# Patient Record
Sex: FEMALE | Race: WHITE | HISPANIC OR LATINO | Employment: FULL TIME | URBAN - METROPOLITAN AREA
[De-identification: names, ages, dates, MRNs, and addresses within clinical notes are randomized per-mention and may not be internally consistent; named-entity substitution may affect disease eponyms.]

---

## 2021-04-28 ENCOUNTER — APPOINTMENT (OUTPATIENT)
Dept: RADIOLOGY | Facility: MEDICAL CENTER | Age: 26
End: 2021-04-28
Attending: EMERGENCY MEDICINE
Payer: COMMERCIAL

## 2021-04-28 ENCOUNTER — ANESTHESIA EVENT (OUTPATIENT)
Dept: SURGERY | Facility: MEDICAL CENTER | Age: 26
End: 2021-04-28
Payer: COMMERCIAL

## 2021-04-28 ENCOUNTER — ANESTHESIA (OUTPATIENT)
Dept: SURGERY | Facility: MEDICAL CENTER | Age: 26
End: 2021-04-28
Payer: COMMERCIAL

## 2021-04-28 ENCOUNTER — PHARMACY VISIT (OUTPATIENT)
Dept: PHARMACY | Facility: MEDICAL CENTER | Age: 26
End: 2021-04-28
Payer: COMMERCIAL

## 2021-04-28 ENCOUNTER — HOSPITAL ENCOUNTER (OUTPATIENT)
Facility: MEDICAL CENTER | Age: 26
End: 2021-04-28
Attending: EMERGENCY MEDICINE | Admitting: OBSTETRICS & GYNECOLOGY
Payer: COMMERCIAL

## 2021-04-28 VITALS
OXYGEN SATURATION: 95 % | HEIGHT: 60 IN | BODY MASS INDEX: 39.66 KG/M2 | DIASTOLIC BLOOD PRESSURE: 88 MMHG | TEMPERATURE: 97.8 F | RESPIRATION RATE: 16 BRPM | SYSTOLIC BLOOD PRESSURE: 133 MMHG | WEIGHT: 202 LBS | HEART RATE: 108 BPM

## 2021-04-28 DIAGNOSIS — R10.9 ABDOMINAL PAIN, UNSPECIFIED ABDOMINAL LOCATION: ICD-10-CM

## 2021-04-28 DIAGNOSIS — R93.89 ABNORMAL PELVIC ULTRASOUND: ICD-10-CM

## 2021-04-28 DIAGNOSIS — G89.18 POSTOPERATIVE PAIN: ICD-10-CM

## 2021-04-28 DIAGNOSIS — D72.829 LEUKOCYTOSIS, UNSPECIFIED TYPE: ICD-10-CM

## 2021-04-28 PROBLEM — N80.9 ENDOMETRIOSIS: Status: ACTIVE | Noted: 2021-04-28

## 2021-04-28 PROBLEM — E66.9 CLASS 2 OBESITY IN ADULT: Status: ACTIVE | Noted: 2021-04-28

## 2021-04-28 LAB
ALBUMIN SERPL BCP-MCNC: 4 G/DL (ref 3.2–4.9)
ALBUMIN/GLOB SERPL: 1.1 G/DL
ALP SERPL-CCNC: 95 U/L (ref 30–99)
ALT SERPL-CCNC: 30 U/L (ref 2–50)
ANION GAP SERPL CALC-SCNC: 10 MMOL/L (ref 7–16)
AST SERPL-CCNC: 15 U/L (ref 12–45)
BASOPHILS # BLD AUTO: 0.3 % (ref 0–1.8)
BASOPHILS # BLD: 0.05 K/UL (ref 0–0.12)
BILIRUB SERPL-MCNC: 0.3 MG/DL (ref 0.1–1.5)
BUN SERPL-MCNC: 10 MG/DL (ref 8–22)
CALCIUM SERPL-MCNC: 9.2 MG/DL (ref 8.5–10.5)
CHLORIDE SERPL-SCNC: 105 MMOL/L (ref 96–112)
CO2 SERPL-SCNC: 22 MMOL/L (ref 20–33)
CREAT SERPL-MCNC: 0.49 MG/DL (ref 0.5–1.4)
EOSINOPHIL # BLD AUTO: 0.06 K/UL (ref 0–0.51)
EOSINOPHIL NFR BLD: 0.3 % (ref 0–6.9)
ERYTHROCYTE [DISTWIDTH] IN BLOOD BY AUTOMATED COUNT: 43.6 FL (ref 35.9–50)
GLOBULIN SER CALC-MCNC: 3.5 G/DL (ref 1.9–3.5)
GLUCOSE SERPL-MCNC: 98 MG/DL (ref 65–99)
HCG SERPL QL: NEGATIVE
HCT VFR BLD AUTO: 42.7 % (ref 37–47)
HGB BLD-MCNC: 13.7 G/DL (ref 12–16)
IMM GRANULOCYTES # BLD AUTO: 0.12 K/UL (ref 0–0.11)
IMM GRANULOCYTES NFR BLD AUTO: 0.6 % (ref 0–0.9)
LIPASE SERPL-CCNC: 19 U/L (ref 11–82)
LYMPHOCYTES # BLD AUTO: 2 K/UL (ref 1–4.8)
LYMPHOCYTES NFR BLD: 10.7 % (ref 22–41)
MCH RBC QN AUTO: 31 PG (ref 27–33)
MCHC RBC AUTO-ENTMCNC: 32.1 G/DL (ref 33.6–35)
MCV RBC AUTO: 96.6 FL (ref 81.4–97.8)
MONOCYTES # BLD AUTO: 0.7 K/UL (ref 0–0.85)
MONOCYTES NFR BLD AUTO: 3.7 % (ref 0–13.4)
NEUTROPHILS # BLD AUTO: 15.8 K/UL (ref 2–7.15)
NEUTROPHILS NFR BLD: 84.4 % (ref 44–72)
NRBC # BLD AUTO: 0 K/UL
NRBC BLD-RTO: 0 /100 WBC
PLATELET # BLD AUTO: 267 K/UL (ref 164–446)
PMV BLD AUTO: 11.5 FL (ref 9–12.9)
POTASSIUM SERPL-SCNC: 4 MMOL/L (ref 3.6–5.5)
PROT SERPL-MCNC: 7.5 G/DL (ref 6–8.2)
RBC # BLD AUTO: 4.42 M/UL (ref 4.2–5.4)
SARS-COV+SARS-COV-2 AG RESP QL IA.RAPID: NOTDETECTED
SARS-COV-2 RNA RESP QL NAA+PROBE: NOTDETECTED
SODIUM SERPL-SCNC: 137 MMOL/L (ref 135–145)
SPECIMEN SOURCE: NORMAL
SPECIMEN SOURCE: NORMAL
WBC # BLD AUTO: 18.7 K/UL (ref 4.8–10.8)

## 2021-04-28 PROCEDURE — U0003 INFECTIOUS AGENT DETECTION BY NUCLEIC ACID (DNA OR RNA); SEVERE ACUTE RESPIRATORY SYNDROME CORONAVIRUS 2 (SARS-COV-2) (CORONAVIRUS DISEASE [COVID-19]), AMPLIFIED PROBE TECHNIQUE, MAKING USE OF HIGH THROUGHPUT TECHNOLOGIES AS DESCRIBED BY CMS-2020-01-R: HCPCS

## 2021-04-28 PROCEDURE — 160048 HCHG OR STATISTICAL LEVEL 1-5: Performed by: OBSTETRICS & GYNECOLOGY

## 2021-04-28 PROCEDURE — 160025 RECOVERY II MINUTES (STATS): Performed by: OBSTETRICS & GYNECOLOGY

## 2021-04-28 PROCEDURE — 700101 HCHG RX REV CODE 250: Performed by: ANESTHESIOLOGY

## 2021-04-28 PROCEDURE — 502704 HCHG DEVICE, LIGASURE IMPACT: Performed by: OBSTETRICS & GYNECOLOGY

## 2021-04-28 PROCEDURE — 84703 CHORIONIC GONADOTROPIN ASSAY: CPT

## 2021-04-28 PROCEDURE — 500002 HCHG ADHESIVE, DERMABOND: Performed by: OBSTETRICS & GYNECOLOGY

## 2021-04-28 PROCEDURE — 700111 HCHG RX REV CODE 636 W/ 250 OVERRIDE (IP): Performed by: ANESTHESIOLOGY

## 2021-04-28 PROCEDURE — 99291 CRITICAL CARE FIRST HOUR: CPT

## 2021-04-28 PROCEDURE — 160009 HCHG ANES TIME/MIN: Performed by: OBSTETRICS & GYNECOLOGY

## 2021-04-28 PROCEDURE — 160035 HCHG PACU - 1ST 60 MINS PHASE I: Performed by: OBSTETRICS & GYNECOLOGY

## 2021-04-28 PROCEDURE — 502703 HCHG DEVICE, LIGASURE V SEALER: Performed by: OBSTETRICS & GYNECOLOGY

## 2021-04-28 PROCEDURE — 501577 HCHG TROCAR, STEP 11MM: Performed by: OBSTETRICS & GYNECOLOGY

## 2021-04-28 PROCEDURE — 88305 TISSUE EXAM BY PATHOLOGIST: CPT

## 2021-04-28 PROCEDURE — 700111 HCHG RX REV CODE 636 W/ 250 OVERRIDE (IP): Performed by: EMERGENCY MEDICINE

## 2021-04-28 PROCEDURE — 76856 US EXAM PELVIC COMPLETE: CPT

## 2021-04-28 PROCEDURE — 500886 HCHG PACK, LAPAROSCOPY: Performed by: OBSTETRICS & GYNECOLOGY

## 2021-04-28 PROCEDURE — 85025 COMPLETE CBC W/AUTO DIFF WBC: CPT

## 2021-04-28 PROCEDURE — 501570 HCHG TROCAR, SEPARATOR: Performed by: OBSTETRICS & GYNECOLOGY

## 2021-04-28 PROCEDURE — 160002 HCHG RECOVERY MINUTES (STAT): Performed by: OBSTETRICS & GYNECOLOGY

## 2021-04-28 PROCEDURE — RXMED WILLOW AMBULATORY MEDICATION CHARGE: Performed by: OBSTETRICS & GYNECOLOGY

## 2021-04-28 PROCEDURE — U0005 INFEC AGEN DETEC AMPLI PROBE: HCPCS

## 2021-04-28 PROCEDURE — 501584 HCHG TROCAR, THRD CAN&SEAL11X100: Performed by: OBSTETRICS & GYNECOLOGY

## 2021-04-28 PROCEDURE — C9803 HOPD COVID-19 SPEC COLLECT: HCPCS | Performed by: OBSTETRICS & GYNECOLOGY

## 2021-04-28 PROCEDURE — 87426 SARSCOV CORONAVIRUS AG IA: CPT

## 2021-04-28 PROCEDURE — 501583 HCHG TROCAR, THRD CAN&SEAL 5X100: Performed by: OBSTETRICS & GYNECOLOGY

## 2021-04-28 PROCEDURE — 700101 HCHG RX REV CODE 250: Performed by: OBSTETRICS & GYNECOLOGY

## 2021-04-28 PROCEDURE — 160036 HCHG PACU - EA ADDL 30 MINS PHASE I: Performed by: OBSTETRICS & GYNECOLOGY

## 2021-04-28 PROCEDURE — 700105 HCHG RX REV CODE 258: Performed by: ANESTHESIOLOGY

## 2021-04-28 PROCEDURE — 80053 COMPREHEN METABOLIC PANEL: CPT

## 2021-04-28 PROCEDURE — 160041 HCHG SURGERY MINUTES - EA ADDL 1 MIN LEVEL 4: Performed by: OBSTETRICS & GYNECOLOGY

## 2021-04-28 PROCEDURE — 501838 HCHG SUTURE GENERAL: Performed by: OBSTETRICS & GYNECOLOGY

## 2021-04-28 PROCEDURE — 58662 LAPAROSCOPY EXCISE LESIONS: CPT | Mod: 80 | Performed by: OBSTETRICS & GYNECOLOGY

## 2021-04-28 PROCEDURE — 83690 ASSAY OF LIPASE: CPT

## 2021-04-28 PROCEDURE — 501399 HCHG SPECIMAN BAG, ENDO CATC: Performed by: OBSTETRICS & GYNECOLOGY

## 2021-04-28 PROCEDURE — 96374 THER/PROPH/DIAG INJ IV PUSH: CPT

## 2021-04-28 PROCEDURE — 160029 HCHG SURGERY MINUTES - 1ST 30 MINS LEVEL 4: Performed by: OBSTETRICS & GYNECOLOGY

## 2021-04-28 PROCEDURE — 96375 TX/PRO/DX INJ NEW DRUG ADDON: CPT

## 2021-04-28 PROCEDURE — 160046 HCHG PACU - 1ST 60 MINS PHASE II: Performed by: OBSTETRICS & GYNECOLOGY

## 2021-04-28 RX ORDER — DOCUSATE SODIUM 100 MG/1
100 CAPSULE, LIQUID FILLED ORAL 2 TIMES DAILY PRN
Qty: 30 CAPSULE | Refills: 1 | Status: SHIPPED | OUTPATIENT
Start: 2021-04-28

## 2021-04-28 RX ORDER — HYDROMORPHONE HYDROCHLORIDE 2 MG/ML
INJECTION, SOLUTION INTRAMUSCULAR; INTRAVENOUS; SUBCUTANEOUS PRN
Status: DISCONTINUED | OUTPATIENT
Start: 2021-04-28 | End: 2021-04-28 | Stop reason: SURG

## 2021-04-28 RX ORDER — MIDAZOLAM HYDROCHLORIDE 1 MG/ML
INJECTION INTRAMUSCULAR; INTRAVENOUS PRN
Status: DISCONTINUED | OUTPATIENT
Start: 2021-04-28 | End: 2021-04-28 | Stop reason: SURG

## 2021-04-28 RX ORDER — KETOROLAC TROMETHAMINE 30 MG/ML
INJECTION, SOLUTION INTRAMUSCULAR; INTRAVENOUS PRN
Status: DISCONTINUED | OUTPATIENT
Start: 2021-04-28 | End: 2021-04-28 | Stop reason: SURG

## 2021-04-28 RX ORDER — HYDROMORPHONE HYDROCHLORIDE 1 MG/ML
0.2 INJECTION, SOLUTION INTRAMUSCULAR; INTRAVENOUS; SUBCUTANEOUS
Status: DISCONTINUED | OUTPATIENT
Start: 2021-04-28 | End: 2021-04-28 | Stop reason: HOSPADM

## 2021-04-28 RX ORDER — LIDOCAINE HYDROCHLORIDE 20 MG/ML
INJECTION, SOLUTION EPIDURAL; INFILTRATION; INTRACAUDAL; PERINEURAL PRN
Status: DISCONTINUED | OUTPATIENT
Start: 2021-04-28 | End: 2021-04-28 | Stop reason: SURG

## 2021-04-28 RX ORDER — SUCCINYLCHOLINE/SOD CL,ISO/PF 200MG/10ML
SYRINGE (ML) INTRAVENOUS PRN
Status: DISCONTINUED | OUTPATIENT
Start: 2021-04-28 | End: 2021-04-28 | Stop reason: SURG

## 2021-04-28 RX ORDER — OXYCODONE HCL 5 MG/5 ML
10 SOLUTION, ORAL ORAL
Status: DISCONTINUED | OUTPATIENT
Start: 2021-04-28 | End: 2021-04-28 | Stop reason: HOSPADM

## 2021-04-28 RX ORDER — BUPIVACAINE HYDROCHLORIDE AND EPINEPHRINE 2.5; 5 MG/ML; UG/ML
INJECTION, SOLUTION EPIDURAL; INFILTRATION; INTRACAUDAL; PERINEURAL
Status: DISCONTINUED | OUTPATIENT
Start: 2021-04-28 | End: 2021-04-28 | Stop reason: HOSPADM

## 2021-04-28 RX ORDER — CYCLOBENZAPRINE HCL 10 MG
10 TABLET ORAL 3 TIMES DAILY PRN
COMMUNITY

## 2021-04-28 RX ORDER — OXYCODONE HCL 5 MG/5 ML
5 SOLUTION, ORAL ORAL
Status: DISCONTINUED | OUTPATIENT
Start: 2021-04-28 | End: 2021-04-28 | Stop reason: HOSPADM

## 2021-04-28 RX ORDER — DIPHENHYDRAMINE HYDROCHLORIDE 50 MG/ML
12.5 INJECTION INTRAMUSCULAR; INTRAVENOUS
Status: DISCONTINUED | OUTPATIENT
Start: 2021-04-28 | End: 2021-04-28 | Stop reason: HOSPADM

## 2021-04-28 RX ORDER — CELECOXIB 200 MG/1
200 CAPSULE ORAL DAILY
COMMUNITY

## 2021-04-28 RX ORDER — IBUPROFEN 200 MG
800 TABLET ORAL EVERY 8 HOURS PRN
Qty: 120 TABLET | Refills: 1 | Status: SHIPPED | OUTPATIENT
Start: 2021-04-28

## 2021-04-28 RX ORDER — HYDROMORPHONE HYDROCHLORIDE 1 MG/ML
0.4 INJECTION, SOLUTION INTRAMUSCULAR; INTRAVENOUS; SUBCUTANEOUS
Status: DISCONTINUED | OUTPATIENT
Start: 2021-04-28 | End: 2021-04-28 | Stop reason: HOSPADM

## 2021-04-28 RX ORDER — OXYCODONE HYDROCHLORIDE AND ACETAMINOPHEN 5; 325 MG/1; MG/1
1 TABLET ORAL EVERY 4 HOURS PRN
Qty: 25 TABLET | Refills: 0 | Status: SHIPPED | OUTPATIENT
Start: 2021-04-28 | End: 2021-05-05

## 2021-04-28 RX ORDER — ONDANSETRON 2 MG/ML
INJECTION INTRAMUSCULAR; INTRAVENOUS PRN
Status: DISCONTINUED | OUTPATIENT
Start: 2021-04-28 | End: 2021-04-28 | Stop reason: SURG

## 2021-04-28 RX ORDER — HYDROMORPHONE HYDROCHLORIDE 1 MG/ML
1 INJECTION, SOLUTION INTRAMUSCULAR; INTRAVENOUS; SUBCUTANEOUS ONCE
Status: COMPLETED | OUTPATIENT
Start: 2021-04-28 | End: 2021-04-28

## 2021-04-28 RX ORDER — ONDANSETRON 2 MG/ML
4 INJECTION INTRAMUSCULAR; INTRAVENOUS ONCE
Status: COMPLETED | OUTPATIENT
Start: 2021-04-28 | End: 2021-04-28

## 2021-04-28 RX ORDER — ONDANSETRON 2 MG/ML
4 INJECTION INTRAMUSCULAR; INTRAVENOUS
Status: DISCONTINUED | OUTPATIENT
Start: 2021-04-28 | End: 2021-04-28 | Stop reason: HOSPADM

## 2021-04-28 RX ORDER — HYDROMORPHONE HYDROCHLORIDE 1 MG/ML
0.1 INJECTION, SOLUTION INTRAMUSCULAR; INTRAVENOUS; SUBCUTANEOUS
Status: DISCONTINUED | OUTPATIENT
Start: 2021-04-28 | End: 2021-04-28 | Stop reason: HOSPADM

## 2021-04-28 RX ORDER — HALOPERIDOL 5 MG/ML
1 INJECTION INTRAMUSCULAR
Status: DISCONTINUED | OUTPATIENT
Start: 2021-04-28 | End: 2021-04-28 | Stop reason: HOSPADM

## 2021-04-28 RX ORDER — SODIUM CHLORIDE, SODIUM LACTATE, POTASSIUM CHLORIDE, CALCIUM CHLORIDE 600; 310; 30; 20 MG/100ML; MG/100ML; MG/100ML; MG/100ML
INJECTION, SOLUTION INTRAVENOUS
Status: DISCONTINUED | OUTPATIENT
Start: 2021-04-28 | End: 2021-04-28 | Stop reason: SURG

## 2021-04-28 RX ORDER — CEFAZOLIN SODIUM 1 G/3ML
INJECTION, POWDER, FOR SOLUTION INTRAMUSCULAR; INTRAVENOUS PRN
Status: DISCONTINUED | OUTPATIENT
Start: 2021-04-28 | End: 2021-04-28 | Stop reason: SURG

## 2021-04-28 RX ORDER — DEXAMETHASONE SODIUM PHOSPHATE 4 MG/ML
INJECTION, SOLUTION INTRA-ARTICULAR; INTRALESIONAL; INTRAMUSCULAR; INTRAVENOUS; SOFT TISSUE PRN
Status: DISCONTINUED | OUTPATIENT
Start: 2021-04-28 | End: 2021-04-28 | Stop reason: SURG

## 2021-04-28 RX ADMIN — Medication 100 MG: at 15:04

## 2021-04-28 RX ADMIN — ONDANSETRON 4 MG: 2 INJECTION INTRAMUSCULAR; INTRAVENOUS at 10:06

## 2021-04-28 RX ADMIN — HYDROMORPHONE HYDROCHLORIDE 0.5 MCG: 2 INJECTION, SOLUTION INTRAMUSCULAR; INTRAVENOUS; SUBCUTANEOUS at 15:44

## 2021-04-28 RX ADMIN — HYDROMORPHONE HYDROCHLORIDE 0.5 MCG: 2 INJECTION, SOLUTION INTRAMUSCULAR; INTRAVENOUS; SUBCUTANEOUS at 15:13

## 2021-04-28 RX ADMIN — DEXAMETHASONE SODIUM PHOSPHATE 4 MG: 4 INJECTION, SOLUTION INTRA-ARTICULAR; INTRALESIONAL; INTRAMUSCULAR; INTRAVENOUS; SOFT TISSUE at 15:09

## 2021-04-28 RX ADMIN — FENTANYL CITRATE 100 MCG: 50 INJECTION, SOLUTION INTRAMUSCULAR; INTRAVENOUS at 15:11

## 2021-04-28 RX ADMIN — ALBUTEROL SULFATE 2.5 MG: 2.5 SOLUTION RESPIRATORY (INHALATION) at 18:24

## 2021-04-28 RX ADMIN — SUGAMMADEX 200 MG: 100 INJECTION, SOLUTION INTRAVENOUS at 16:39

## 2021-04-28 RX ADMIN — ROCURONIUM BROMIDE 25 MG: 10 INJECTION, SOLUTION INTRAVENOUS at 15:10

## 2021-04-28 RX ADMIN — FENTANYL CITRATE 100 MCG: 50 INJECTION, SOLUTION INTRAMUSCULAR; INTRAVENOUS at 15:04

## 2021-04-28 RX ADMIN — HYDROMORPHONE HYDROCHLORIDE 1 MG: 1 INJECTION, SOLUTION INTRAMUSCULAR; INTRAVENOUS; SUBCUTANEOUS at 10:06

## 2021-04-28 RX ADMIN — PROPOFOL 150 MG: 10 INJECTION, EMULSION INTRAVENOUS at 15:04

## 2021-04-28 RX ADMIN — MIDAZOLAM HYDROCHLORIDE 2 MG: 1 INJECTION, SOLUTION INTRAMUSCULAR; INTRAVENOUS at 14:59

## 2021-04-28 RX ADMIN — ONDANSETRON 4 MG: 2 INJECTION INTRAMUSCULAR; INTRAVENOUS at 15:09

## 2021-04-28 RX ADMIN — ROCURONIUM BROMIDE 25 MG: 10 INJECTION, SOLUTION INTRAVENOUS at 15:39

## 2021-04-28 RX ADMIN — CEFAZOLIN 2 G: 330 INJECTION, POWDER, FOR SOLUTION INTRAMUSCULAR; INTRAVENOUS at 15:09

## 2021-04-28 RX ADMIN — SODIUM CHLORIDE, POTASSIUM CHLORIDE, SODIUM LACTATE AND CALCIUM CHLORIDE: 600; 310; 30; 20 INJECTION, SOLUTION INTRAVENOUS at 14:59

## 2021-04-28 RX ADMIN — KETOROLAC TROMETHAMINE 30 MG: 30 INJECTION, SOLUTION INTRAMUSCULAR at 16:26

## 2021-04-28 RX ADMIN — LIDOCAINE HYDROCHLORIDE 100 MG: 20 INJECTION, SOLUTION EPIDURAL; INFILTRATION; INTRACAUDAL at 15:04

## 2021-04-28 ASSESSMENT — PAIN DESCRIPTION - PAIN TYPE
TYPE: SURGICAL PAIN

## 2021-04-28 NOTE — ANESTHESIA PREPROCEDURE EVALUATION
Relevant Problems   Other   (+) Class 2 obesity in adult   (+) Endometriosis       Physical Exam    Airway   Mallampati: II  TM distance: >3 FB  Neck ROM: full       Cardiovascular - normal exam  Rhythm: regular  Rate: normal  (-) murmur     Dental - normal exam           Pulmonary - normal exam  Breath sounds clear to auscultation     Abdominal   (+) obese     Neurological - normal exam                 Anesthesia Plan    ASA 3- EMERGENT   ASA physical status 3 criteria: morbid obesity - BMI greater than or equal to 40ASA physical status emergent criteria: compromised vital organ, limb or tissue    Plan - general       Airway plan will be ETT          Induction: intravenous    Postoperative Plan: Postoperative administration of opioids is intended.    Pertinent diagnostic labs and testing reviewed    Informed Consent:    Anesthetic plan and risks discussed with patient.    Use of blood products discussed with: patient whom consented to blood products.

## 2021-04-28 NOTE — ED TRIAGE NOTES
"Chief Complaint   Patient presents with   • Abdominal Pain     Pt was recently diagnosed with endometriosis earlier this AM. Pt is currently on her cycle and states they are now much more painful. This morning pt was lying in bed when she felt a \"sharp cramp and a firework of pain travel up through her abd\". Pt feels that her stomach is now hard. Pt states she is bleeding more since this pain.      /95   Pulse 95   Temp 36.7 °C (98.1 °F) (Temporal)   Resp 18   Ht 1.524 m (5')   Wt 91.6 kg (202 lb)   SpO2 97%   BMI 39.45 kg/m²     Patient arrived to ED for the above complaint. Triage process explained to patient. Patient placed in lobby and told to notify staff of any changes.   "

## 2021-04-28 NOTE — ANESTHESIA PROCEDURE NOTES
Airway    Date/Time: 4/28/2021 3:07 PM  Performed by: Cricket Alonso M.D.  Authorized by: Crciket Alonso M.D.     Location:  OR  Urgency:  Elective  Indications for Airway Management:  Anesthesia      Spontaneous Ventilation: absent    Sedation Level:  Deep  Preoxygenated: Yes    Patient Position:  Sniffing  Mask Difficulty Assessment:  1 - vent by mask  Final Airway Type:  Endotracheal airway  Final Endotracheal Airway:  ETT  Cuffed: Yes    Technique Used for Successful ETT Placement:  Direct laryngoscopy    Insertion Site:  Oral  Blade Type:  Glide  Laryngoscope Blade/Videolaryngoscope Blade Size:  3  ETT Size (mm):  7.0  Measured from:  Teeth  ETT to Teeth (cm):  21  Placement Verified by: auscultation and capnometry    Cormack-Lehane Classification:  Grade I - full view of glottis  Number of Attempts at Approach:  1  Number of Other Approaches Attempted:  1  Unsuccessful Approach(es) for ETT:  Direct laryngoscopy

## 2021-04-28 NOTE — H&P
DATE OF ADMISSION:  2021     HISTORY OF PRESENT ILLNESS:  The patient is a 25-year-old female,  0,   who presents to the [facility] Emergency Department today complaining of sudden   onset of lower abdominal and pelvic pain starting this morning.  The patient   reports that she was lying in bed this morning and began to feel her normal   menstrual cramps, which became severe and sharp and stabbing and traveled up   her abdomen up to her chest.  The patient reports screaming due to the   severity of her pain.  She also had nausea associated with the pain, but no   vomiting.  She tried taking a warm bath and took her Celebrex and   cyclobenzaprine, both of which provided her no relief.  She presented to the   Emergency Department for evaluation.  The patient last had food or drink at   8:00 p.m. last night, the patient has been seeing my partner, Dr. Denise Lucero, for painful periods and possible endometriosis.  The patient has been   trying for pregnancy, so declined hormonal management of her symptoms when she   was evaluated by Dr. Lucero.  She also reports having a known right ovarian   cyst, but was unsure of the size of the cyst on her last imaging.  The patient   denies fevers or chills.  No nausea.  She did have an episode of diarrhea   last night.  Her last menstrual period was yesterday 2021.  She reports   her flow is heavy, but she is not saturating pads or tampons.     PAST OBSTETRIC HISTORY:   0.     PAST GYNECOLOGIC HISTORY:  No sexually transmitted infections.  Last Pap was   in  and within normal limits.  She has dysmenorrhea for the past six   months and heavy menstrual cycles, and possible endometriosis, diagnosed this   April.     PAST MEDICAL HISTORY:  History of asthma, but the patient states that it has   resolved and she does not take medications for this.     PAST SURGICAL HISTORY:  Laparoscopic cholecystectomy in .     MEDICATIONS:  Cyclobenzaprine and  Celebrex.     ALLERGIES:  TO STRAWBERRIES, SHE REPORTS HER THROAT CLOSES.     SOCIAL HISTORY:  Negative for tobacco, alcohol, or drug use.  She is .     FAMILY HISTORY:  Father with heart disease.  Mom with polycystic ovarian   syndrome.     REVIEW OF SYSTEMS:  All systems are reviewed and are negative except as stated   above.     PHYSICAL EXAMINATION:  VITAL SIGNS:  Her pulse is 106, respiratory rate 20, oxygen saturation 98% on   room air, blood pressure 109/66, and temperature 98.1.  GENERAL APPEARANCE:  Well-developed and well-nourished female, appears in   pain.  NECK:  Supple, nontender.  HEART:  Tachycardia.  RESPIRATORY:  Normal effort, no distress.  ABDOMEN:  Soft, diffusely tender, but worse in the right lower quadrant and   left lower quadrant, guarding is present, but no rebound.  EXTREMITIES:  Nontender bilaterally without cyanosis, clubbing, or edema.  PELVIC:  Examination deferred, will be performed in the operating room.     LABORATORY DATA:  White blood cell count is 18.7, hemoglobin 13.7, hematocrit   42.7, and platelets 267.  Her CMP is within normal limits.  Pregnancy test   negative.     RADIOLOGIC DATA:  Pelvic ultrasound, uterus is 3.6 x 8.6 x 5.7 cm.  Myometrium   within normal limits.  Endometrial thickness 8 mm.  Left ovary 4.7 x 2.6 x   3.2 cm, 5.6 x 4.6 heterogeneous lobulated mass in the right adnexa with flow   along the periphery and small area of low resistive flow in the center.  No   free fluid seen.     ASSESSMENT:   1.  A 25-year-old female  0 with acute abdominal and pelvic pain.  2.  A 5.6 cm right adnexal mass highly suspicious for ovarian torsion.  3.  Possible endometriosis.     PLAN:  The patient will be admitted for outpatient surgery to [facility].  She will undergo a laparoscopic right ovarian cystectomy with   possible oophorectomy. If ovarian torsion is present and the ovary has indeed   no blood flow.  The patient is aware that she would  lose her entire right   ovary.  She is also aware that on ultrasound, sometimes the cyst is on the   left side and is aware that it could be either on the right or left side.  The   patient was extensively counseled on the surgical procedure in detail.  She   is counseled on risks including, but are not limited to pain, infection,   bleeding, blood transfusion, injury to adjacent organs, anesthesia   complications, and death.  All the patient's questions were answered.  She   understands she desires to proceed with surgery.  She will be discharged home   postoperative with pain medications and outpatient followup with her   gynecologist.        ______________________________  MD ANNI Navarro/ROYA    DD:  04/28/2021 13:17  DT:  04/28/2021 15:09    Job#:  [id number]

## 2021-04-28 NOTE — OR SURGEON
Immediate Post OP Note    PreOp Diagnosis:   Acute abdominal and pelvic pain.   5.6cm right adnexal mass highly suspicious for ovarian torsion.   Possible endometriosis.      PostOp Diagnosis:   Acute Abdominal and pelvic pain.   Ruptured right hemorrhagic ovarian cyst.   Stage IV endometriosis.  Dense pelvic adhesions.      Procedure(s):  PELVISCOPY, LYSIS OF ADHESIONS - Wound Class: Clean  EXCISION, CYST, OVARY - Wound Class: Clean    Surgeon(s):  Hannah Van M.D.    Assist:  Kalya Truong MD    Anesthesiologist/Type of Anesthesia:  Anesthesiologist: Cricket Alonso M.D./General    Surgical Staff:  Circulator: Nicki Roach R.N.; Sherron Agosto R.N.  Relief Scrub: Yazmin Barron  Scrub Person: Jd Lazaro    Specimens removed if any:  ID Type Source Tests Collected by Time Destination   A : RIGHT OVARIAN CYST Tissue Ovary PATHOLOGY SPECIMEN Hannah Van M.D. 4/28/2021  3:56 PM        Estimated Blood Loss: 50mL    Findings: normal liver edge, gallbladder surgically absent, hemoperitoneum in pelvis, filmy adhesions from anterior uterus to anterior abdominal wall, bilateral hydrosalpinx, fimbria clear of adhesions, right ovary markedly enlarged with 2 hemorrhagic cysts one of which ruptured, right ovary adhered to posterior uterus and right pelvic side wall, left ovary adhered to posterior uterus and left pelvic side wall, left ovary otherwise normal, filmy adhesions in posterior cul de sac, endometriosis implants on sigmoid colon, uterus normal in size and shape    Complications: none        4/28/2021 4:58 PM Hannah Van M.D.

## 2021-04-28 NOTE — OR NURSING
1656: received to PACU via rney with oral airway. Respirations spontaneous and unlabored. Abdomen soft. 3 surgical lap site wounds to abdomen with dermabond. Ice pack placed over the abdomen.  1708: patient awaken. Oral airway dc'd without problem or difficulty.  1720: denies pain or nausea.   1740: water given. Tolerated well. Instructed on how to use the incentive spirometer. Able to get to 750 only. encouraged to cough and deep breath. Still very sleepy.  1805: able to get to 1500 with the incentive spirometer. Encouraged to cough and deep breath,  1824: Albuterol NPPB treatment administered after getting order from Dr. Alonso. Patient O2 saturation 85-88 % on 2 L/ nc. Lungs diminished at the bases.  1900: report called to Jessica OLSON.  1904: transported via gurney to PACU 2. Stable. Patient wearing face mask.

## 2021-04-28 NOTE — ED NOTES
1000  Wheeled to room by ED tech. Pt already in a hospital gown. Placed on the monitor/pulse ox/bp.  erp at bedside. Plan of care explained to pt.

## 2021-04-28 NOTE — H&P
GYNECOLOGY ADMIT NOTE:    26yo G0 presented to ED with acute pelvic and abdominal pain. On ultrasound was found to have a 5.6cm right ovarian cystic mass, suspicious for torsion. The patient was extensively counseled on ER findings and recommended surgical procedure. Will transfer patient to the OR for laparoscopic ovarian cystectomy with possible oophorectomy. She was counseled on surgery risks/benefits/alternatives as well. Consents were signed.     See dictated H&P for full details.

## 2021-04-28 NOTE — ED NOTES
Med rec updated and complete  Allergies reviewed  Interviewed pt with friend at bedside with permission from pt.  Pt reports no vitamins  Pt reports no antibiotics in the last 2 weeks    No current facility-administered medications on file prior to encounter.     Current Outpatient Medications on File Prior to Encounter   Medication Sig Dispense Refill   • cyclobenzaprine (FLEXERIL) 10 mg Tab Take 10 mg by mouth 3 times a day as needed for Moderate Pain.     • celecoxib (CELEBREX) 200 MG Cap Take 200 mg by mouth every day.     • APAP-Pamabrom-Pyrilamine (PAMPRIN MAX PAIN FORMULA PO) Take 2 Tablets by mouth as needed (For pain).

## 2021-04-28 NOTE — ANESTHESIA TIME REPORT
Anesthesia Start and Stop Event Times     Date Time Event    4/28/2021 1418 Ready for Procedure     1459 Anesthesia Start     1657 Anesthesia Stop        Responsible Staff  04/28/21    Name Role Begin End    Cricket Alonso M.D. Anesth 1459 1653        Preop Diagnosis (Free Text):  Pre-op Diagnosis     OVARIAN TORSION        Preop Diagnosis (Codes):    Post op Diagnosis  Hemorrhagic ovarian cyst      Premium Reason  K. Alert    Comments:

## 2021-04-28 NOTE — ED PROVIDER NOTES
"ED Provider Note    Scribed for Manan Gutierrez M.D. by Adarsh Cotton. 4/28/2021  9:43 AM    Primary care provider: No primary care provider noted.  Means of arrival: Walk-in  History obtained from: Patient  History limited by: None    CHIEF COMPLAINT  Chief Complaint   Patient presents with   • Abdominal Pain     Pt was recently diagnosed with endometriosis earlier this AM. Pt is currently on her cycle and states they are now much more painful. This morning pt was lying in bed when she felt a \"sharp cramp and a firework of pain travel up through her abd\". Pt feels that her stomach is now hard. Pt states she is bleeding more since this pain.        HPI  Bárbara Humphrey is a 25 y.o. female who presents to the Emergency Department for acute, moderate mid abdominal pain onset this morning. Patient states that she was laying in bed when she began to feel her normal period cramps which suddenly progressed into an intense, sharp pain traveling up her abdomen. She also reports having pain to her right flank. Patient states that she was diagnosed with endometriosis earlier this month. She denies any associated dysuria, fevers, vomiting, diarrhea, or constipation. Patient has a history of cholecystectomy. Her gynecologist is Dr. Lucero.     REVIEW OF SYSTEMS  Pertinent positives include abdominal pain.   Pertinent negatives include no dysuria, fevers, vomiting, diarrhea, or constipation.    All other systems reviewed and negative. See HPI for further details.       PAST MEDICAL HISTORY   has a past medical history of Endometriosis.    SURGICAL HISTORY  patient denies any surgical history    SOCIAL HISTORY  Social History     Tobacco Use   • Smoking status: Never Smoker   • Smokeless tobacco: Never Used   Substance Use Topics   • Alcohol use: Yes     Comment: Socially   • Drug use: Never      Social History     Substance and Sexual Activity   Drug Use Never       FAMILY HISTORY  History reviewed. No pertinent family " history.    CURRENT MEDICATIONS  Home Medications     Reviewed by Caron Gonzalez R.N. (Registered Nurse) on 04/28/21 at 0852  Med List Status: Not Addressed   Medication Last Dose Status        Patient Jw Taking any Medications                       ALLERGIES  No Known Allergies    PHYSICAL EXAM  VITAL SIGNS: /95   Pulse 95   Temp 36.7 °C (98.1 °F) (Temporal)   Resp 18   Ht 1.524 m (5')   Wt 91.6 kg (202 lb)   SpO2 97%   BMI 39.45 kg/m²     Nursing note and vitals reviewed.  Constitutional: Well-developed and well-nourished.  Somewhat stoic, but in moderate distress secondary to pain.  HENT: Head is normocephalic and atraumatic. Oropharynx is clear and moist without exudate or erythema.   Eyes: Pupils are equal, round, and reactive to light. Conjunctiva are normal.   Cardiovascular: Normal rate and regular rhythm. No murmur heard. Normal radial pulses.  Pulmonary/Chest: Breath sounds normal. No wheezes or rales.   Abdominal: Mild, mid abdominal tenderness. Soft, non-distended. Normal active bowel sounds. No guarding or peritoneal signs. No palpable abdominal aortic aneurysm. No masses. No tenderness at McBurney's point.   Musculoskeletal: Extremities exhibit normal range of motion without edema or tenderness.   Neurological: Awake, alert and oriented to person, place, and time. No focal deficits noted.  Skin: Skin is warm and dry. No rash.  Psychiatric: Normal mood and affect. Appropriate for clinical situation    DIAGNOSTIC STUDIES / PROCEDURES    LABS  Results for orders placed or performed during the hospital encounter of 04/28/21   CBC WITH DIFFERENTIAL   Result Value Ref Range    WBC 18.7 (H) 4.8 - 10.8 K/uL    RBC 4.42 4.20 - 5.40 M/uL    Hemoglobin 13.7 12.0 - 16.0 g/dL    Hematocrit 42.7 37.0 - 47.0 %    MCV 96.6 81.4 - 97.8 fL    MCH 31.0 27.0 - 33.0 pg    MCHC 32.1 (L) 33.6 - 35.0 g/dL    RDW 43.6 35.9 - 50.0 fL    Platelet Count 267 164 - 446 K/uL    MPV 11.5 9.0 - 12.9 fL     Neutrophils-Polys 84.40 (H) 44.00 - 72.00 %    Lymphocytes 10.70 (L) 22.00 - 41.00 %    Monocytes 3.70 0.00 - 13.40 %    Eosinophils 0.30 0.00 - 6.90 %    Basophils 0.30 0.00 - 1.80 %    Immature Granulocytes 0.60 0.00 - 0.90 %    Nucleated RBC 0.00 /100 WBC    Neutrophils (Absolute) 15.80 (H) 2.00 - 7.15 K/uL    Lymphs (Absolute) 2.00 1.00 - 4.80 K/uL    Monos (Absolute) 0.70 0.00 - 0.85 K/uL    Eos (Absolute) 0.06 0.00 - 0.51 K/uL    Baso (Absolute) 0.05 0.00 - 0.12 K/uL    Immature Granulocytes (abs) 0.12 (H) 0.00 - 0.11 K/uL    NRBC (Absolute) 0.00 K/uL   COMP METABOLIC PANEL   Result Value Ref Range    Sodium 137 135 - 145 mmol/L    Potassium 4.0 3.6 - 5.5 mmol/L    Chloride 105 96 - 112 mmol/L    Co2 22 20 - 33 mmol/L    Anion Gap 10.0 7.0 - 16.0    Glucose 98 65 - 99 mg/dL    Bun 10 8 - 22 mg/dL    Creatinine 0.49 (L) 0.50 - 1.40 mg/dL    Calcium 9.2 8.5 - 10.5 mg/dL    AST(SGOT) 15 12 - 45 U/L    ALT(SGPT) 30 2 - 50 U/L    Alkaline Phosphatase 95 30 - 99 U/L    Total Bilirubin 0.3 0.1 - 1.5 mg/dL    Albumin 4.0 3.2 - 4.9 g/dL    Total Protein 7.5 6.0 - 8.2 g/dL    Globulin 3.5 1.9 - 3.5 g/dL    A-G Ratio 1.1 g/dL   LIPASE   Result Value Ref Range    Lipase 19 11 - 82 U/L   BETA-HCG QUALITATIVE SERUM   Result Value Ref Range    Beta-Hcg Qualitative Serum Negative Negative   ESTIMATED GFR   Result Value Ref Range    GFR If African American >60 >60 mL/min/1.73 m 2    GFR If Non African American >60 >60 mL/min/1.73 m 2     All labs reviewed by me.    RADIOLOGY  US-PELVIC COMPLETE (TRANSABDOMINAL/TRANSVAGINAL) (COMBO)   Final Result      Large heterogeneous lobulated mass in the right. This is nonspecific and could relate to a large hemorrhagic cyst or endometrioma. Torsion cannot be entirely excluded.        The radiologist's interpretation of all radiological studies have been reviewed by me.    COURSE & MEDICAL DECISION MAKING  Nursing notes, VS, PMSFHx reviewed in chart.     9:43 AM - Patient seen and  examined at bedside. Patient treated with Dilaudid 1 mg and Zofran 4 mg. Ordered US-pelvic, beta-HCG Qual Serum, CBC w/ diff, CMP, Lipase, and UA to evaluate her symptoms. The differential diagnoses include but are not limited to: Ovarian cyst, ovarian torsion, urinary tract infection, pregnancy, ectopic pregnancy      11:56 AM laboratory studies remarkable for a white blood cell count of 18.  The patient has been afebrile does not have any infectious symptoms.  May be related to stress response.  Ultrasound findings as above.  Large heterogeneous mass on the right.  Nonspecific finding.  Possible endometrioma or hemorrhagic cyst.  Torsion is not entirely excluded.  I spoke with the patient's gynecologist Dr. Lucero.  Reviewed these findings.  She informs me that Dr. Van is on-call for consults and asked me to contact her to have her see the patient.  I spoke with Dr. Van.  Conveyed the findings.  Dr. Van states that she will come to the emergency department to evaluate the patient.  My partner Dr. Ranjit Cardoza will assume care at this time.  Pending further treatment and disposition as per OB/GYN recommendations.      FINAL IMPRESSION  1. Abdominal pain, unspecified abdominal location    2. Leukocytosis, unspecified type    3. Abnormal pelvic ultrasound          IAdarsh (Estella), am scribing for, and in the presence of, Manan Gutierrez M.D..    Electronically signed by: Adarsh Cotton (Estella), 4/28/2021    Manan AWAD M.D. personally performed the services described in this documentation, as scribed by Adarsh Cotton in my presence, and it is both accurate and complete.    The note accurately reflects work and decisions made by me.  Manan Gutierrez M.D.  4/28/2021  11:57 AM

## 2021-04-29 LAB — PATHOLOGY CONSULT NOTE: NORMAL

## 2021-04-29 NOTE — DISCHARGE INSTRUCTIONS
ACTIVITY: Rest and take it easy for the first 24 hours.  A responsible adult is recommended to remain with you during that time.  It is normal to feel sleepy.  We encourage you to not do anything that requires balance, judgment or coordination.    MILD FLU-LIKE SYMPTOMS ARE NORMAL. YOU MAY EXPERIENCE GENERALIZED MUSCLE ACHES, THROAT IRRITATION, HEADACHE AND/OR SOME NAUSEA.    FOR 24 HOURS DO NOT:  Drive, operate machinery or run household appliances.  Drink beer or alcoholic beverages.   Make important decisions or sign legal documents.    SPECIAL INSTRUCTIONS: Strict pelvic rest x 2 weeks.  Rx for Percocet 5/325mg and Ibuprofen 800mg as needed for pain.  Incisions closed with sutures and skin glue, do not pick off glue.  May shower daily.  Ice packs to abdomen first 24 hours then use heat.  Follow Up with Dr. Lucero for postop check in 1-2 weeks    DIET: To avoid nausea, slowly advance diet as tolerated, avoiding spicy or greasy foods for the first day.  Add more substantial food to your diet according to your physician's instructions.   INCREASE FLUIDS AND FIBER TO AVOID CONSTIPATION.    FOLLOW-UP APPOINTMENT:  A follow-up appointment should be arranged with your doctor in 1-2 weeks; call to schedule.    You should CALL YOUR PHYSICIAN if you develop:  Fever greater than 101 degrees F.  Pain not relieved by medication, or persistent nausea or vomiting.  Excessive bleeding (blood soaking through dressing) or unexpected drainage from the wound.  Extreme redness or swelling around the incision site, drainage of pus or foul smelling drainage.  Inability to urinate or empty your bladder within 8 hours.  Problems with breathing or chest pain.    You should call 911 if you develop problems with breathing or chest pain.  If you are unable to contact your doctor or surgical center, you should go to the nearest emergency room or urgent care center.    Physician's telephone #: [phone] Dr Lucero    If any questions  arise, call your doctor.  If your doctor is not available, please feel free to call the Surgical Center at [phone]. The Contact Center is open Monday through Friday 7AM to 5PM and may speak to a nurse at [phone], or toll free at [phone].     A registered nurse may call you a few days after your surgery to see how you are doing after your procedure.    MEDICATIONS: Resume taking daily medication.  Take prescribed pain medication with food.  If no medication is prescribed, you may take non-aspirin pain medication if needed.  PAIN MEDICATION CAN BE VERY CONSTIPATING.  Take a stool softener or laxative such as senokot, pericolace, or milk of magnesia if needed.    Prescription given for Percocet  and ibuprofen for pain and Colace (stool softener).  Last pain medication given NONE.    If your physician has prescribed pain medication that includes Acetaminophen (Tylenol), do not take additional Acetaminophen (Tylenol) while taking the prescribed medication.    Depression / Suicide Risk    As you are discharged from this [facility] facility, it is important to learn how to keep safe from harming yourself.    Recognize the warning signs:  · Abrupt changes in personality, positive or negative- including increase in energy   · Giving away possessions  · Change in eating patterns- significant weight changes-  positive or negative  · Change in sleeping patterns- unable to sleep or sleeping all the time   · Unwillingness or inability to communicate  · Depression  · Unusual sadness, discouragement and loneliness  · Talk of wanting to die  · Neglect of personal appearance   · Rebelliousness- reckless behavior  · Withdrawal from people/activities they love  · Confusion- inability to concentrate     If you or a loved one observes any of these behaviors or has concerns about self-harm, here's what you can do:  · Talk about it- your feelings and reasons for harming yourself  · Remove any means that you might use  to hurt yourself (examples: pills, rope, extension cords, firearm)  · Get professional help from the community (Mental Health, Substance Abuse, psychological counseling)  · Do not be alone:Call your Safe Contact- someone whom you trust who will be there for you.  · Call your local CRISIS HOTLINE 035-4165 or 348-150-1823  · Call your local Children's Mobile Crisis Response Team [place] (147) 822-9050 or www.Skyonic  · Call the toll free National Suicide Prevention Hotlines   · National Suicide Prevention Lifeline 245-107-HKVT (0055)  · National Hope Line Network 800-SUICIDE (274-8453)

## 2021-04-29 NOTE — DISCHARGE PLANNING
Meds-to-Beds: Discharge prescription orders listed below delivered to patient at [facility]. RN notified. Patient's  and friend counseled. Patient elected to have co-payment billed to patient account.       Bárbara Humphrey   Home Medication Instructions RICHARD:[id number]    Printed on:04/28/21 7787   Medication Information                      docusate sodium (COLACE) 100 MG Cap  Take 1 capsule by mouth 2 times a day as needed for Constipation.             ibuprofen (MOTRIN) 200 MG Tab  Take 4 Tablets by mouth every 8 hours as needed.             oxyCODONE-acetaminophen (PERCOCET) 5-325 MG Tab  Take 1 tablet by mouth every 4 hours as needed for up to 7 days.                 Coretta Preciado, PharmD

## 2021-04-29 NOTE — OR NURSING
Arrived to Phase II after report from Lilliam Rn    VSS, denies nausea, reports pain tolerable. Ambulated from gurney to chair with standby assist.    Surgical site CDI     Alarms on and set to appropriate parameters. Call light within reach, rounding in place.

## 2021-04-29 NOTE — ANESTHESIA POSTPROCEDURE EVALUATION
Patient: Bárbara Humphrey    Procedure Summary     Date: 04/28/21 Room / Location: [place] [address] / SURGERY [facility]    Anesthesia Start: 1459 Anesthesia Stop: 1657    Procedures:       PELVISCOPY, LYSIS OF ADHESIONS      EXCISION, CYST, OVARY (Right ) Diagnosis: (RUPTURED HEMORRHAGIC RIGHT OVARIAN CYST STAGE 3 ENDOMETRIOSI)    Surgeons: Hannah Van M.D. Responsible Provider: Cricket Alonso M.D.    Anesthesia Type: general ASA Status: 3 - Emergent          Final Anesthesia Type: general  Last vitals  BP   Blood Pressure: 133/88    Temp   36.6 °C (97.8 °F)    Pulse   (!) 108   Resp   16    SpO2   95 %      Anesthesia Post Evaluation    Patient location during evaluation: PACU  Patient participation: complete - patient participated  Level of consciousness: awake and alert    Airway patency: patent  Anesthetic complications: no  Cardiovascular status: hemodynamically stable  Respiratory status: acceptable  Hydration status: euvolemic    PONV: none          No complications documented.     Nurse Pain Score: 3 (NPRS)

## 2021-04-29 NOTE — OP REPORT
DATE OF SERVICE:  04/28/2021     PREOPERATIVE DIAGNOSES:  1.  Acute abdominal and pelvic pain.  2.  A 5.6 cm right adnexal mass, highly suspicious for ovarian torsion.  3.  Possible endometriosis.     POSTOPERATIVE DIAGNOSES:  1.  Acute abdominal and pelvic pain.  2.  Ruptured right hemorrhagic ovarian cyst.  3.  Stage IV endometriosis.  4.  Dense pelvic adhesions.     PROCEDURE:  Laparoscopic right ovarian cystectomy with extensive lysis of   adhesions.     SURGEON:  Hannah Ramos MD     ASSISTANT:  Selin Truong DO     ANESTHESIA:  General.     ANESTHESIOLOGIST:  Cricket Alonso MD     ESTIMATED BLOOD LOSS:  50 mL.     URINE OUTPUT:  70 mL.     COMPLICATIONS:  None.     SPECIMENS:  Right ovarian cyst.     FINDINGS:  Normal liver edge, gallbladder surgically absent.  Hemoperitoneum   in the pelvis.  Filmy adhesions from anterior uterus to anterior abdominal   wall.  Bilateral hydrosalpinx present.  Fimbria, however, were clear of   adhesions.  The right ovary was markedly enlarged with two hemorrhagic cysts   present, one of which was ruptured and actively bleeding.  The right ovary was   adhered to the posterior uterus and the right pelvic sidewall.  The left   ovary was adhered to the posterior uterus and the left pelvic sidewall.  The   left ovary, otherwise was normal without cysts or tumors or masses.  There   were filmy adhesions in the posterior cul-de-sac, endometriosis implants on   the sigmoid colon.  Uterus was normal in size and shape.     DESCRIPTION OF PROCEDURE:  After appropriate consents were obtained, the   patient was taken to the operating room where general anesthesia was obtained   without difficulty.  A Castle catheter was placed in her bladder.  She was   prepped and draped in dorsal lithotomy position in the [facility], usual   sterile fashion was performed.  A sponge stick was placed into the vagina for   uterine manipulation during the case.  Attention was turned to the  patient's   abdomen.  A 0.25% Marcaine with epinephrine was injected into the umbilicus.    A 5 mm skin incision was made with a scalpel.  The 5 mm trocar was placed over   the laparoscope and they were advanced into the abdominal cavity under direct   laparoscopic visualization.  Gas was then insufflated to not exceed 15 mmHg   and intra-abdominal placement was once again confirmed.  The patient was then   placed in Trendelenburg position.  Two more ports were placed, one in the   right lower quadrant and one in the left lower quadrant.  A 5 mm was in the   right lower quadrant and an 11 mm in the left lower quadrant.  They were both   placed in the same fashion.  Examination of the abdominal and pelvic cavity   revealed the findings as noted above.  Due to the dense adhesions, I called   for another gynecologist to assist me for the duration of the case.  The right   ovary was markedly enlarged and had a ruptured cyst present with blood in the   pelvis.  Using atraumatic graspers, the right ovary was first bluntly   dissected away with traction and countertraction off of the posterior surface   of the uterus and from the pelvic sidewall.  The area of the ruptured cyst was   visualized.  The cyst was grasped at the wall and the cyst was peeled away   from the capsule of the ovary.  This was done with traction and   countertraction.  There was noted to be a second cyst, which was attached and   this was also removed in the same fashion with traction and countertraction.    Once we were at the base of the cyst, then the LigaSure device was used to   excise the base of the cyst from the remaining ovarian tissue.  The right   ovary was spared.  There were some small areas of oozing where the cyst was   removed that was made hemostatic with the LigaSure device.  The posterior   uterus was adhered to both ovaries.  The adhesions were previously taken down   from the right side and then the left ovary had to be dissected  away from the   posterior uterus and the left pelvic sidewall as well.  This was done in a   blunt fashion.  The adhesions anterior to the uterus were also filmy and taken   down with blunt dissection.  The pelvis was copiously irrigated with normal   saline.  The surgical sites were hemostatic.  The right ovary where the cyst   was located was filled with Jaymie powder to ensure additional hemostasis.    Jaymie was also placed in the posterior cul-de-sac on the adhesion sites as   they were generally oozy, but there was excellent hemostasis upon the   completion of the case.  The right ovarian cysts were then placed in an   EndoCatch bag and were removed in their entirety through the 11 mm port site.    The pelvis was examined once again and was noted to be hemostatic.  All the   gas was then removed from the patient's abdomen.  The trocars were removed.    The left lower quadrant 11 mm port site fascia was reapproximated with 0   Vicryl suture in a figure-of-eight pattern.  The fascia was palpated and   closed well.  All the skin incisions were closed with 4-0 Monocryl suture in a   subcuticular fashion.  The skin was then reapproximated with Dermabond.  The   patient was placed in a flattened position.  The Castle catheter was removed   from the bladder and the sponge stick was removed from the vagina.  The   procedure was complete.  The patient tolerated the procedure well.  Sponge,   lap and needle counts were correct x2.  She was taken to recovery in a stable   condition.        ______________________________  MD ANNI Navarro/NIKITA    DD:  04/28/2021 17:34  DT:  04/28/2021 19:11    Job#:  [id number]

## 2021-04-29 NOTE — OR NURSING
Pt's VSS; denies N/V; states pain is at tolerable level. Dressing CDI to abdomen. D/c orders received. IV dc'd. Pt changed into clothing with assistance. Pt up and ambulated to BR, steady gait, voided adequately. Discharge instructions given as well as pain management handout; pt and family verbalized understanding and questions answered. Patient states ready to d/c home. No prescriptions given. Pt dc'd in w/c with  in stable condition.

## 2022-07-02 ENCOUNTER — APPOINTMENT (OUTPATIENT)
Dept: RADIOLOGY | Facility: MEDICAL CENTER | Age: 27
End: 2022-07-02
Attending: EMERGENCY MEDICINE
Payer: COMMERCIAL

## 2022-07-02 ENCOUNTER — HOSPITAL ENCOUNTER (EMERGENCY)
Facility: MEDICAL CENTER | Age: 27
End: 2022-07-03
Attending: EMERGENCY MEDICINE
Payer: COMMERCIAL

## 2022-07-02 DIAGNOSIS — N83.202 CYST OF LEFT OVARY: ICD-10-CM

## 2022-07-02 DIAGNOSIS — R10.10 PAIN OF UPPER ABDOMEN: ICD-10-CM

## 2022-07-02 LAB
ALBUMIN SERPL BCP-MCNC: 4.3 G/DL (ref 3.2–4.9)
ALBUMIN/GLOB SERPL: 1.4 G/DL
ALP SERPL-CCNC: 89 U/L (ref 30–99)
ALT SERPL-CCNC: 25 U/L (ref 2–50)
ANION GAP SERPL CALC-SCNC: 13 MMOL/L (ref 7–16)
AST SERPL-CCNC: 22 U/L (ref 12–45)
BASOPHILS # BLD AUTO: 0.3 % (ref 0–1.8)
BASOPHILS # BLD: 0.04 K/UL (ref 0–0.12)
BILIRUB SERPL-MCNC: 0.4 MG/DL (ref 0.1–1.5)
BUN SERPL-MCNC: 8 MG/DL (ref 8–22)
CALCIUM SERPL-MCNC: 8.7 MG/DL (ref 8.5–10.5)
CHLORIDE SERPL-SCNC: 107 MMOL/L (ref 96–112)
CO2 SERPL-SCNC: 21 MMOL/L (ref 20–33)
CREAT SERPL-MCNC: 0.49 MG/DL (ref 0.5–1.4)
EOSINOPHIL # BLD AUTO: 0.26 K/UL (ref 0–0.51)
EOSINOPHIL NFR BLD: 1.7 % (ref 0–6.9)
ERYTHROCYTE [DISTWIDTH] IN BLOOD BY AUTOMATED COUNT: 48.6 FL (ref 35.9–50)
GFR SERPLBLD CREATININE-BSD FMLA CKD-EPI: 131 ML/MIN/1.73 M 2
GLOBULIN SER CALC-MCNC: 3 G/DL (ref 1.9–3.5)
GLUCOSE SERPL-MCNC: 108 MG/DL (ref 65–99)
HCG SERPL QL: NEGATIVE
HCT VFR BLD AUTO: 36.4 % (ref 37–47)
HGB BLD-MCNC: 12 G/DL (ref 12–16)
IMM GRANULOCYTES # BLD AUTO: 0.08 K/UL (ref 0–0.11)
IMM GRANULOCYTES NFR BLD AUTO: 0.5 % (ref 0–0.9)
LYMPHOCYTES # BLD AUTO: 2.61 K/UL (ref 1–4.8)
LYMPHOCYTES NFR BLD: 17.3 % (ref 22–41)
MCH RBC QN AUTO: 31 PG (ref 27–33)
MCHC RBC AUTO-ENTMCNC: 33 G/DL (ref 33.6–35)
MCV RBC AUTO: 94.1 FL (ref 81.4–97.8)
MONOCYTES # BLD AUTO: 0.86 K/UL (ref 0–0.85)
MONOCYTES NFR BLD AUTO: 5.7 % (ref 0–13.4)
NEUTROPHILS # BLD AUTO: 11.22 K/UL (ref 2–7.15)
NEUTROPHILS NFR BLD: 74.5 % (ref 44–72)
NRBC # BLD AUTO: 0 K/UL
NRBC BLD-RTO: 0 /100 WBC
PLATELET # BLD AUTO: 277 K/UL (ref 164–446)
PMV BLD AUTO: 11 FL (ref 9–12.9)
POTASSIUM SERPL-SCNC: 3.5 MMOL/L (ref 3.6–5.5)
PROT SERPL-MCNC: 7.3 G/DL (ref 6–8.2)
RBC # BLD AUTO: 3.87 M/UL (ref 4.2–5.4)
SODIUM SERPL-SCNC: 141 MMOL/L (ref 135–145)
WBC # BLD AUTO: 15.1 K/UL (ref 4.8–10.8)

## 2022-07-02 PROCEDURE — 36415 COLL VENOUS BLD VENIPUNCTURE: CPT

## 2022-07-02 PROCEDURE — 84703 CHORIONIC GONADOTROPIN ASSAY: CPT

## 2022-07-02 PROCEDURE — 96374 THER/PROPH/DIAG INJ IV PUSH: CPT

## 2022-07-02 PROCEDURE — 85025 COMPLETE CBC W/AUTO DIFF WBC: CPT

## 2022-07-02 PROCEDURE — 80053 COMPREHEN METABOLIC PANEL: CPT

## 2022-07-02 PROCEDURE — 99284 EMERGENCY DEPT VISIT MOD MDM: CPT

## 2022-07-02 PROCEDURE — 700111 HCHG RX REV CODE 636 W/ 250 OVERRIDE (IP): Performed by: EMERGENCY MEDICINE

## 2022-07-02 RX ORDER — MORPHINE SULFATE 4 MG/ML
4 INJECTION INTRAVENOUS ONCE
Status: COMPLETED | OUTPATIENT
Start: 2022-07-02 | End: 2022-07-02

## 2022-07-02 RX ADMIN — MORPHINE SULFATE 4 MG: 4 INJECTION INTRAVENOUS at 23:19

## 2022-07-02 ASSESSMENT — FIBROSIS 4 INDEX: FIB4 SCORE: 0.29

## 2022-07-03 VITALS
OXYGEN SATURATION: 94 % | RESPIRATION RATE: 18 BRPM | HEIGHT: 60 IN | HEART RATE: 88 BPM | BODY MASS INDEX: 38.28 KG/M2 | WEIGHT: 195 LBS | TEMPERATURE: 99.7 F | SYSTOLIC BLOOD PRESSURE: 111 MMHG | DIASTOLIC BLOOD PRESSURE: 67 MMHG

## 2022-07-03 PROCEDURE — 96375 TX/PRO/DX INJ NEW DRUG ADDON: CPT

## 2022-07-03 PROCEDURE — 700111 HCHG RX REV CODE 636 W/ 250 OVERRIDE (IP): Performed by: EMERGENCY MEDICINE

## 2022-07-03 PROCEDURE — 76856 US EXAM PELVIC COMPLETE: CPT

## 2022-07-03 RX ORDER — KETOROLAC TROMETHAMINE 30 MG/ML
15 INJECTION, SOLUTION INTRAMUSCULAR; INTRAVENOUS ONCE
Status: COMPLETED | OUTPATIENT
Start: 2022-07-03 | End: 2022-07-03

## 2022-07-03 RX ADMIN — KETOROLAC TROMETHAMINE 15 MG: 30 INJECTION, SOLUTION INTRAMUSCULAR; INTRAVENOUS at 01:46

## 2022-07-03 ASSESSMENT — PAIN DESCRIPTION - PAIN TYPE
TYPE: SURGICAL PAIN
TYPE: SURGICAL PAIN

## 2022-07-03 NOTE — ED NOTES
Patient re-medicated for pain. Discharged with instruction to follow up to OB/GYN. Verbalized understanding.

## 2022-07-03 NOTE — ED TRIAGE NOTES
Bárbara Humphrey  26 y.o. female    Chief Complaint   Patient presents with   • Pelvic Pain     Pt arrives with complaints of pelvic pain that began today. Pt states she has hx of endometriosis, started menstrual cycle Thursday. Hx ovarian cyst and torsion. Pain 9/10- progressively worsening throughout day    Denies vomiting/fevers.     Vitals:    07/02/22 2200   BP: 138/88   Pulse: (!) 110   Resp: 20   Temp: 37.8 °C (100 °F)   SpO2: 98%       Triage process explained to patient, apologized for wait time, and returned to lobby.  Pt informed to notify staff of any change in condition.

## 2022-07-03 NOTE — ED PROVIDER NOTES
ED Provider Note    CHIEF COMPLAINT  Chief Complaint   Patient presents with   • Pelvic Pain       HPI  Bárbara Humphrey is a 26 y.o. female who presents to the emergency department chief complaint of mid abdominal pain and pelvic discomfort.  The patient has a history of endometriosis she states that about a year ago she had an emergent surgery for possible ovarian torsion in addition to having a large cyst rupture.  She states that this feels similar to when she needed that surgery.  She follows up with Dr. Lucero with OB/GYN.  She states she started her menstrual cycle last evening early this morning and she usually gets strong discomfort with her menses secondary to this but this is more than normal.  Associate with nausea no vomiting she states she tried Tylenol at home without relief of symptoms.  No fevers no chills no dysuria no hematuria no flank pain no diarrhea no constipation no bloody stools or bloody emesis.    REVIEW OF SYSTEMS  Positives as above. Pertinent negatives include vomiting fevers chills cough congestion dysuria hematuria flank pain vaginal discharge itching or burning diarrhea constipation bloody stools bloody emesis  All other review of systems are negative    PAST MEDICAL HISTORY   has a past medical history of Endometriosis.    SOCIAL HISTORY  Social History     Tobacco Use   • Smoking status: Never Smoker   • Smokeless tobacco: Never Used   Substance and Sexual Activity   • Alcohol use: Yes     Comment: Socially   • Drug use: Never   • Sexual activity: Not on file       SURGICAL HISTORY   has a past surgical history that includes pelviscopy (04/28/2021); lap,diagnostic abdomen (4/28/2021); and ovarian cystectomy (Right, 4/28/2021).    CURRENT MEDICATIONS  Home Medications     Reviewed by Gina Singh R.N. (Registered Nurse) on 07/02/22 at 2221  Med List Status: Not Addressed   Medication Last Dose Status   APAP-Pamabrom-Pyrilamine (PAMPRIN MAX PAIN FORMULA PO)  Active   celecoxib  (CELEBREX) 200 MG Cap  Active   cyclobenzaprine (FLEXERIL) 10 mg Tab  Active   docusate sodium (COLACE) 100 MG Cap  Active   ibuprofen (MOTRIN) 200 MG Tab  Active                ALLERGIES  No Known Allergies    PHYSICAL EXAM  VITAL SIGNS: /88   Pulse (!) 110   Temp 37.8 °C (100 °F) (Temporal)   Resp 20   Ht 1.524 m (5')   Wt 88.5 kg (195 lb)   SpO2 98%   BMI 38.08 kg/m²    Pulse ox interpretation: I interpret this pulse ox as normal.  Constitutional: Alert in no apparent distress.  HENT: Normocephalic atraumatic, MMM  Eyes: PER, Conjunctiva normal, Non-icteric.   Neck: Normal range of motion, No tenderness, Supple, No stridor.   Cardiovascular: Regular rate and rhythm, no murmurs.   Thorax & Lungs: Normal breath sounds, No respiratory distress, No wheezing, No chest tenderness.   Abdomen: Bowel sounds normal, Soft, mild mid abdomen above the umbilicus tenderness close to the epigastrium no rebound or guarding mild bilateral lower quadrant abdominal tenderness as well without rebound or guarding, No pulsatile masses. No peritoneal signs.  Skin: Warm, Dry, No erythema, No rash.   Back: No bony tenderness, No CVA tenderness.   Extremities/MSK: Intact equal distal pulses, No edema, No tenderness, No cyanosis, no major deformities noted  Neurologic: Alert and oriented x3, No focal deficits noted.       DIFFERENTIAL DIAGNOSIS AND WORK UP PLAN    This is a 26 y.o. female who presents with abdominal pain she states the last time she had the ovarian cyst rupture she had pain higher up in her abdomen like she is having currently she is had a cholecystectomy before no right upper quadrant tenderness no GI concerns or diarrhea or constipation she does have some tenderness but no peritoneal signs.  The plan is for laboratory analysis pain management and then a pelvic ultrasound to evaluate for ovarian rupture or ovarian torsion     DIAGNOSTIC STUDIES / PROCEDURES    EKG  No results found for this or any previous  visit.    LABS  Pertinent Lab Findings    Labs Reviewed   CBC WITH DIFFERENTIAL - Abnormal; Notable for the following components:       Result Value    WBC 15.1 (*)     RBC 3.87 (*)     Hematocrit 36.4 (*)     MCHC 33.0 (*)     Neutrophils-Polys 74.50 (*)     Lymphocytes 17.30 (*)     Neutrophils (Absolute) 11.22 (*)     Monos (Absolute) 0.86 (*)     All other components within normal limits   COMP METABOLIC PANEL - Abnormal; Notable for the following components:    Potassium 3.5 (*)     Glucose 108 (*)     Creatinine 0.49 (*)     All other components within normal limits   HCG QUAL SERUM   ESTIMATED GFR       RADIOLOGY  US-PELVIC COMPLETE (TRANSABDOMINAL/TRANSVAGINAL) (COMBO)   Final Result      1.  Large likely left ovarian complex cystic mass measuring 8.2 x 7.2 x 6.2 cm in size. This contains diffuse internal echoes. Most likely process would be that of an endometrioma or hemorrhagic cyst however neoplastic cystic ovarian mass cannot be    excluded. Follow-up ultrasound in 6 weeks is recommended to evaluate for interval resolution.      2.  Otherwise normal examination.        The radiologist's interpretation of all radiological studies have been reviewed by me.      COURSE & MEDICAL DECISION MAKING  Pertinent Labs & Imaging studies reviewed. (See chart for details)    1:29 AM  Reassessed patient at bedside laboratory Alysis does reveal mildly low white blood cell count she is feeling a lot better after the morphine and abdominal exam she still not peritoneal to some minimal tenderness more in the mid abdomen than anything else.  She does have a very large left ovarian cyst but at this time there is good blood flow and no other signs of torsion or rupture.  She will be treated with Toradol and then we discussed the importance of follow-up with OB/GYN as she already is established with Dr. Lucero.  She does have strict return precautions in the next 1 to 2 days for any worsening pain fevers or vomiting.  She  understands and feels comfortable going home    /67   Pulse 88   Temp 37.6 °C (99.7 °F)   Resp 18   Ht 1.524 m (5')   Wt 88.5 kg (195 lb)   SpO2 94%   BMI 38.08 kg/m²       I verified that the patient was wearing a mask and I was wearing appropriate PPE every time I entered the room. The patient's mask was on the patient at all times during my encounter except for a brief view of the oropharynx.      The patient will return for new or worsening symptoms and is stable at the time of discharge.    The patient is referred to a primary physician for blood pressure management, diabetic screening, and for all other preventative health concerns.    DISPOSITION:  Patient will be discharged home in stable condition.    FOLLOW UP:  [facility], Emergency Dept  [facility] [facility] [address] [phone]    If symptoms worsen    Denise Lucero M.D.  [address]  [facility] [id number] [phone]    Call on 7/5/2022        OUTPATIENT MEDICATIONS:  Discharge Medication List as of 7/3/2022  1:52 AM            FINAL IMPRESSION  1. Pain of upper abdomen     2. Cyst of left ovary             Electronically signed by: Loretta Casas M.D., 7/2/2022 10:51 PM    This dictation has been created using voice recognition software and/or scribes. The accuracy of the dictation is limited by the abilities of the software and the expertise of the scribes. I expect there may be some errors of grammar and possibly content. I made every attempt to manually correct the errors within my dictation. However, errors related to voice recognition software and/or scribes may still exist and should be interpreted within the appropriate context.

## 2022-07-18 ENCOUNTER — HOSPITAL ENCOUNTER (EMERGENCY)
Facility: MEDICAL CENTER | Age: 27
End: 2022-07-19
Attending: STUDENT IN AN ORGANIZED HEALTH CARE EDUCATION/TRAINING PROGRAM
Payer: COMMERCIAL

## 2022-07-18 ENCOUNTER — APPOINTMENT (OUTPATIENT)
Dept: RADIOLOGY | Facility: MEDICAL CENTER | Age: 27
End: 2022-07-18
Attending: STUDENT IN AN ORGANIZED HEALTH CARE EDUCATION/TRAINING PROGRAM
Payer: COMMERCIAL

## 2022-07-18 DIAGNOSIS — N83.202 CYST OF LEFT OVARY: ICD-10-CM

## 2022-07-18 DIAGNOSIS — R31.0 GROSS HEMATURIA: ICD-10-CM

## 2022-07-18 LAB
ALBUMIN SERPL BCP-MCNC: 4.3 G/DL (ref 3.2–4.9)
ALBUMIN/GLOB SERPL: 1.3 G/DL
ALP SERPL-CCNC: 84 U/L (ref 30–99)
ALT SERPL-CCNC: 21 U/L (ref 2–50)
AMORPH CRY #/AREA URNS HPF: PRESENT /HPF
ANION GAP SERPL CALC-SCNC: 12 MMOL/L (ref 7–16)
APPEARANCE UR: ABNORMAL
AST SERPL-CCNC: 16 U/L (ref 12–45)
BACTERIA #/AREA URNS HPF: NEGATIVE /HPF
BACTERIA GENITAL QL WET PREP: NORMAL
BASOPHILS # BLD AUTO: 0.6 % (ref 0–1.8)
BASOPHILS # BLD: 0.07 K/UL (ref 0–0.12)
BILIRUB SERPL-MCNC: 0.2 MG/DL (ref 0.1–1.5)
BILIRUB UR QL STRIP.AUTO: NEGATIVE
BUN SERPL-MCNC: 10 MG/DL (ref 8–22)
CALCIUM SERPL-MCNC: 9.5 MG/DL (ref 8.5–10.5)
CHLORIDE SERPL-SCNC: 107 MMOL/L (ref 96–112)
CK SERPL-CCNC: 54 U/L (ref 0–154)
CO2 SERPL-SCNC: 21 MMOL/L (ref 20–33)
COLOR UR: ABNORMAL
CREAT SERPL-MCNC: 0.51 MG/DL (ref 0.5–1.4)
EOSINOPHIL # BLD AUTO: 0.29 K/UL (ref 0–0.51)
EOSINOPHIL NFR BLD: 2.4 % (ref 0–6.9)
EPI CELLS #/AREA URNS HPF: ABNORMAL /HPF
ERYTHROCYTE [DISTWIDTH] IN BLOOD BY AUTOMATED COUNT: 46.9 FL (ref 35.9–50)
GFR SERPLBLD CREATININE-BSD FMLA CKD-EPI: 130 ML/MIN/1.73 M 2
GLOBULIN SER CALC-MCNC: 3.4 G/DL (ref 1.9–3.5)
GLUCOSE SERPL-MCNC: 88 MG/DL (ref 65–99)
GLUCOSE UR STRIP.AUTO-MCNC: NEGATIVE MG/DL
HCG SERPL QL: NEGATIVE
HCT VFR BLD AUTO: 38.9 % (ref 37–47)
HGB BLD-MCNC: 12.5 G/DL (ref 12–16)
HYALINE CASTS #/AREA URNS LPF: ABNORMAL /LPF
IMM GRANULOCYTES # BLD AUTO: 0.03 K/UL (ref 0–0.11)
IMM GRANULOCYTES NFR BLD AUTO: 0.3 % (ref 0–0.9)
KETONES UR STRIP.AUTO-MCNC: NEGATIVE MG/DL
LEUKOCYTE ESTERASE UR QL STRIP.AUTO: NEGATIVE
LIPASE SERPL-CCNC: 23 U/L (ref 11–82)
LYMPHOCYTES # BLD AUTO: 4.15 K/UL (ref 1–4.8)
LYMPHOCYTES NFR BLD: 35 % (ref 22–41)
MCH RBC QN AUTO: 30.1 PG (ref 27–33)
MCHC RBC AUTO-ENTMCNC: 32.1 G/DL (ref 33.6–35)
MCV RBC AUTO: 93.7 FL (ref 81.4–97.8)
MICRO URNS: ABNORMAL
MONOCYTES # BLD AUTO: 0.89 K/UL (ref 0–0.85)
MONOCYTES NFR BLD AUTO: 7.5 % (ref 0–13.4)
MUCOUS THREADS #/AREA URNS HPF: ABNORMAL /HPF
NEUTROPHILS # BLD AUTO: 6.43 K/UL (ref 2–7.15)
NEUTROPHILS NFR BLD: 54.2 % (ref 44–72)
NITRITE UR QL STRIP.AUTO: NEGATIVE
NRBC # BLD AUTO: 0 K/UL
NRBC BLD-RTO: 0 /100 WBC
PH UR STRIP.AUTO: 6 [PH] (ref 5–8)
PLATELET # BLD AUTO: 369 K/UL (ref 164–446)
PMV BLD AUTO: 11.4 FL (ref 9–12.9)
POTASSIUM SERPL-SCNC: 4.2 MMOL/L (ref 3.6–5.5)
PROT SERPL-MCNC: 7.7 G/DL (ref 6–8.2)
PROT UR QL STRIP: 30 MG/DL
RBC # BLD AUTO: 4.15 M/UL (ref 4.2–5.4)
RBC # URNS HPF: >150 /HPF
RBC UR QL AUTO: ABNORMAL
SIGNIFICANT IND 70042: NORMAL
SITE SITE: NORMAL
SODIUM SERPL-SCNC: 140 MMOL/L (ref 135–145)
SOURCE SOURCE: NORMAL
SP GR UR STRIP.AUTO: 1.02
UROBILINOGEN UR STRIP.AUTO-MCNC: 0.2 MG/DL
WBC # BLD AUTO: 11.9 K/UL (ref 4.8–10.8)
WBC #/AREA URNS HPF: ABNORMAL /HPF

## 2022-07-18 PROCEDURE — 76856 US EXAM PELVIC COMPLETE: CPT

## 2022-07-18 PROCEDURE — 96374 THER/PROPH/DIAG INJ IV PUSH: CPT

## 2022-07-18 PROCEDURE — 74176 CT ABD & PELVIS W/O CONTRAST: CPT

## 2022-07-18 PROCEDURE — 80053 COMPREHEN METABOLIC PANEL: CPT

## 2022-07-18 PROCEDURE — 81001 URINALYSIS AUTO W/SCOPE: CPT

## 2022-07-18 PROCEDURE — 82550 ASSAY OF CK (CPK): CPT

## 2022-07-18 PROCEDURE — 700111 HCHG RX REV CODE 636 W/ 250 OVERRIDE (IP): Performed by: STUDENT IN AN ORGANIZED HEALTH CARE EDUCATION/TRAINING PROGRAM

## 2022-07-18 PROCEDURE — 87591 N.GONORRHOEAE DNA AMP PROB: CPT

## 2022-07-18 PROCEDURE — 99285 EMERGENCY DEPT VISIT HI MDM: CPT

## 2022-07-18 PROCEDURE — 700102 HCHG RX REV CODE 250 W/ 637 OVERRIDE(OP): Performed by: STUDENT IN AN ORGANIZED HEALTH CARE EDUCATION/TRAINING PROGRAM

## 2022-07-18 PROCEDURE — 87491 CHLMYD TRACH DNA AMP PROBE: CPT

## 2022-07-18 PROCEDURE — 83690 ASSAY OF LIPASE: CPT

## 2022-07-18 PROCEDURE — 84703 CHORIONIC GONADOTROPIN ASSAY: CPT

## 2022-07-18 PROCEDURE — 36415 COLL VENOUS BLD VENIPUNCTURE: CPT

## 2022-07-18 PROCEDURE — 85025 COMPLETE CBC W/AUTO DIFF WBC: CPT

## 2022-07-18 PROCEDURE — 700105 HCHG RX REV CODE 258: Performed by: STUDENT IN AN ORGANIZED HEALTH CARE EDUCATION/TRAINING PROGRAM

## 2022-07-18 PROCEDURE — A9270 NON-COVERED ITEM OR SERVICE: HCPCS | Performed by: STUDENT IN AN ORGANIZED HEALTH CARE EDUCATION/TRAINING PROGRAM

## 2022-07-18 RX ORDER — TAMSULOSIN HYDROCHLORIDE 0.4 MG/1
0.4 CAPSULE ORAL ONCE
Status: COMPLETED | OUTPATIENT
Start: 2022-07-18 | End: 2022-07-18

## 2022-07-18 RX ORDER — SODIUM CHLORIDE 9 MG/ML
1000 INJECTION, SOLUTION INTRAVENOUS ONCE
Status: COMPLETED | OUTPATIENT
Start: 2022-07-18 | End: 2022-07-18

## 2022-07-18 RX ORDER — KETOROLAC TROMETHAMINE 30 MG/ML
30 INJECTION, SOLUTION INTRAMUSCULAR; INTRAVENOUS ONCE
Status: COMPLETED | OUTPATIENT
Start: 2022-07-18 | End: 2022-07-18

## 2022-07-18 RX ADMIN — SODIUM CHLORIDE 1000 ML: 9 INJECTION, SOLUTION INTRAVENOUS at 22:22

## 2022-07-18 RX ADMIN — TAMSULOSIN HYDROCHLORIDE 0.4 MG: 0.4 CAPSULE ORAL at 22:22

## 2022-07-18 RX ADMIN — KETOROLAC TROMETHAMINE 30 MG: 30 INJECTION, SOLUTION INTRAMUSCULAR; INTRAVENOUS at 22:24

## 2022-07-18 ASSESSMENT — FIBROSIS 4 INDEX: FIB4 SCORE: 0.44

## 2022-07-19 VITALS
HEART RATE: 78 BPM | BODY MASS INDEX: 38.28 KG/M2 | SYSTOLIC BLOOD PRESSURE: 135 MMHG | OXYGEN SATURATION: 97 % | TEMPERATURE: 97.1 F | WEIGHT: 195 LBS | RESPIRATION RATE: 18 BRPM | DIASTOLIC BLOOD PRESSURE: 85 MMHG | HEIGHT: 60 IN

## 2022-07-19 LAB
C TRACH DNA GENITAL QL NAA+PROBE: NEGATIVE
N GONORRHOEA DNA GENITAL QL NAA+PROBE: NEGATIVE
SPECIMEN SOURCE: NORMAL

## 2022-07-19 NOTE — ED PROVIDER NOTES
"ED Provider Note    CHIEF COMPLAINT  Chief Complaint   Patient presents with   • Blood in Urine     \"My urine is full of blood\"    • Low Back Pain   • Abdominal Pain     Lower abdominal pain       HPI  Bárbara Humphrey is a 26 y.o. female who presents with blood in her urine that started this morning and has worsened throughout today.  Patient reports she started with low back and lower abdominal pain last night worse on the left than the right.  She reports it was just some discomfort, but then the symptoms worsened today.  She denies any vaginal bleeding, denies measured fevers although has chills.  Has had some nausea but no vomiting or diarrhea.  She denies history of kidney stone.  She denies dysuria.  She has a history of endometriosis.    REVIEW OF SYSTEMS  See HPI for further details. All other systems are negative.     PAST MEDICAL HISTORY   has a past medical history of Endometriosis.    SOCIAL HISTORY  Social History     Tobacco Use   • Smoking status: Never Smoker   • Smokeless tobacco: Never Used   Substance and Sexual Activity   • Alcohol use: Yes     Comment: Socially   • Drug use: Never   • Sexual activity: Not on file       SURGICAL HISTORY   has a past surgical history that includes pelviscopy (04/28/2021); lap,diagnostic abdomen (4/28/2021); and ovarian cystectomy (Right, 4/28/2021).    CURRENT MEDICATIONS  Home Medications     Reviewed by Leta Cruz R.N. (Registered Nurse) on 07/18/22 at 1918  Med List Status: Not Addressed   Medication Last Dose Status   APAP-Pamabrom-Pyrilamine (PAMPRIN MAX PAIN FORMULA PO)  Active   celecoxib (CELEBREX) 200 MG Cap  Active   cyclobenzaprine (FLEXERIL) 10 mg Tab  Active   docusate sodium (COLACE) 100 MG Cap  Active   ibuprofen (MOTRIN) 200 MG Tab  Active                ALLERGIES  No Known Allergies    PHYSICAL EXAM  VITAL SIGNS: /85   Pulse 78   Temp 36.2 °C (97.1 °F) (Temporal)   Resp 18   Ht 1.524 m (5')   Wt 88.5 kg (195 lb)   LMP " 07/04/2022 (Exact Date)   SpO2 97%   BMI 38.08 kg/m²     Pulse ox interpretation: I interpret this pulse ox as normal.  Constitutional: Alert in no apparent distress.  HENT: No signs of trauma, Bilateral external ears normal, Nose normal.   Eyes: Pupils are equal and reactive, Conjunctiva normal, Non-icteric.   Neck: Normal range of motion, No tenderness, Supple, No stridor.   Cardiovascular: Regular rate and rhythm, no murmurs.   Thorax & Lungs: Normal breath sounds, No respiratory distress, No wheezing.   Abdomen:  Soft, mild left lower quadrant tenderness, no guarding, No masses, No pulsatile masses. No peritoneal signs.  Skin: Warm, Dry, No erythema, No rash.   Back: No bony tenderness, No CVA tenderness.   Extremities: Intact distal pulses, No edema, No tenderness, No cyanosis.  Musculoskeletal: Good range of motion in all major joints. No major deformities noted.   Neurologic: Alert , Normal motor function,  No focal deficits noted.   Psychiatric: Affect normal, Judgment normal, Mood normal.       DIAGNOSTIC STUDIES / PROCEDURES    LABS  Results for orders placed or performed during the hospital encounter of 07/18/22   CBC WITH DIFFERENTIAL   Result Value Ref Range    WBC 11.9 (H) 4.8 - 10.8 K/uL    RBC 4.15 (L) 4.20 - 5.40 M/uL    Hemoglobin 12.5 12.0 - 16.0 g/dL    Hematocrit 38.9 37.0 - 47.0 %    MCV 93.7 81.4 - 97.8 fL    MCH 30.1 27.0 - 33.0 pg    MCHC 32.1 (L) 33.6 - 35.0 g/dL    RDW 46.9 35.9 - 50.0 fL    Platelet Count 369 164 - 446 K/uL    MPV 11.4 9.0 - 12.9 fL    Neutrophils-Polys 54.20 44.00 - 72.00 %    Lymphocytes 35.00 22.00 - 41.00 %    Monocytes 7.50 0.00 - 13.40 %    Eosinophils 2.40 0.00 - 6.90 %    Basophils 0.60 0.00 - 1.80 %    Immature Granulocytes 0.30 0.00 - 0.90 %    Nucleated RBC 0.00 /100 WBC    Neutrophils (Absolute) 6.43 2.00 - 7.15 K/uL    Lymphs (Absolute) 4.15 1.00 - 4.80 K/uL    Monos (Absolute) 0.89 (H) 0.00 - 0.85 K/uL    Eos (Absolute) 0.29 0.00 - 0.51 K/uL    Baso  (Absolute) 0.07 0.00 - 0.12 K/uL    Immature Granulocytes (abs) 0.03 0.00 - 0.11 K/uL    NRBC (Absolute) 0.00 K/uL   COMP METABOLIC PANEL   Result Value Ref Range    Sodium 140 135 - 145 mmol/L    Potassium 4.2 3.6 - 5.5 mmol/L    Chloride 107 96 - 112 mmol/L    Co2 21 20 - 33 mmol/L    Anion Gap 12.0 7.0 - 16.0    Glucose 88 65 - 99 mg/dL    Bun 10 8 - 22 mg/dL    Creatinine 0.51 0.50 - 1.40 mg/dL    Calcium 9.5 8.5 - 10.5 mg/dL    AST(SGOT) 16 12 - 45 U/L    ALT(SGPT) 21 2 - 50 U/L    Alkaline Phosphatase 84 30 - 99 U/L    Total Bilirubin 0.2 0.1 - 1.5 mg/dL    Albumin 4.3 3.2 - 4.9 g/dL    Total Protein 7.7 6.0 - 8.2 g/dL    Globulin 3.4 1.9 - 3.5 g/dL    A-G Ratio 1.3 g/dL   LIPASE   Result Value Ref Range    Lipase 23 11 - 82 U/L   HCG QUAL SERUM   Result Value Ref Range    Beta-Hcg Qualitative Serum Negative Negative   URINALYSIS,CULTURE IF INDICATED    Specimen: Urine   Result Value Ref Range    Color Red (A)     Character Cloudy (A)     Specific Gravity 1.025 <1.035    Ph 6.0 5.0 - 8.0    Glucose Negative Negative mg/dL    Ketones Negative Negative mg/dL    Protein 30 (A) Negative mg/dL    Bilirubin Negative Negative    Urobilinogen, Urine 0.2 Negative    Nitrite Negative Negative    Leukocyte Esterase Negative Negative    Occult Blood Large (A) Negative    Micro Urine Req Microscopic    URINE MICROSCOPIC (W/UA)   Result Value Ref Range    WBC 0-2 /hpf    RBC >150 (A) /hpf    Bacteria Negative None /hpf    Epithelial Cells Few /hpf    Mucous Threads Few /hpf    Amorphous Crystal Present /hpf    Hyaline Cast 0-2 /lpf   ESTIMATED GFR   Result Value Ref Range    GFR (CKD-EPI) 130 >60 mL/min/1.73 m 2   CREATINE KINASE   Result Value Ref Range    CPK Total 54 0 - 154 U/L   Chlamydia/GC, PCR (Genital/Anal swab)   Result Value Ref Range    Source Vaginal    WET PREP    Specimen: Vaginal; Genital   Result Value Ref Range    Significant Indicator NEG     Source GEN     Site VAGINAL     Wet Prep For Parasites        Few WBCs seen.  No yeast.  No motile Trichomonas seen.  No clue cells seen.           RADIOLOGY  US-PELVIC COMPLETE (TRANSABDOMINAL/TRANSVAGINAL) (COMBO)   Final Result         1.  Large hypoechoic lesion in the left ovary with specular echoes, appearance suggests large hemorrhagic cyst or endometrioma, ovarian mass is not excluded. Follow-up evaluation in 6 weeks recommended for repeat characterization and evaluation of    stability.   2.  Second hypoechoic lesion in the left ovary suggests hemorrhagic cyst, can be reevaluated at follow-up exam.   3.  Thickened endometrial stripe, likely proliferative changes given patient age, consider endometrial pathology with additional workup as clinically appropriate.      CT-RENAL COLIC EVALUATION(A/P W/O)   Final Result         1.  Heterogeneous mass in the left pelvis superior to the bladder, appearance suggests left ovarian mass. Differential could include tubo-ovarian abscess or neoplastic lesion. Recommend further characterization with pelvic ultrasound. Correlate with    negative beta-hCG to definitively exclude hemorrhagic ectopic pregnancy.   2.  Bilateral nephrolithiasis without visualized obstructive changes.   3.  Hepatomegaly      These findings were discussed with the patient's clinician, Karma Dobbins, on 7/18/2022 10:33 PM.              COURSE & MEDICAL DECISION MAKING  Pertinent Labs & Imaging studies reviewed. (See chart for details)  12:41 AM  Discussed the case with Dr. Gonzalez of OB/GYN.  She states given the findings, this does not appear to have the appearance of a tubo-ovarian abscess or active infection.  Unlikely to fistulized to the bladder.  She is comfortable with outpatient follow-up with her OB/GYN.      26-year-old female presenting with gross hematuria and lower abdominal pain for 1 day worse on left.  She has normal vital signs emergency department, tenderness in the left adnexa and left lower quadrant exam.  Initial history was most  concerning for kidney stone.  CT was done which did not show evidence of ureteral stone but did show a few stones in the kidneys, likely has already passed the stone.  UA shows no signs of infection.  Does have gross hematuria.  Patient with normal renal function and electrolytes and labs otherwise apart from a mildly elevated leukocytosis.  CK was normal.  CT did show a large mass in the left ovary area, so ultrasound was done which showed a left ovarian cyst very large that was similar to prior imaging a few weeks ago.  Patient not pregnant, not ectopic pregnancy.  Case was discussed with on-call OB/GYN, at this time do not feel it represents tubo-ovarian abscess although STD testing was sent.  Unlikely to have caused fistula to the bladder.  Discharged with referrals to urology and advised to follow-up with her OB/GYN.      The patient will not drink alcohol nor drive with prescribed medications. The patient will return for worsening symptoms and is stable at the time of discharge. The patient verbalizes understanding and will comply.    FINAL IMPRESSION  1. Cyst of left ovary     2. Gross hematuria  Referral to Urology         Electronically signed by: Karma Dobbins M.D., 7/18/2022 9:27 PM

## 2022-07-19 NOTE — ED TRIAGE NOTES
"Chief Complaint   Patient presents with   • Blood in Urine     \"My urine is full of blood\"    • Low Back Pain   • Abdominal Pain     Lower abdominal pain     Pt states she started experiencing bloody urine today along with lower abdominal cramping and lower back pain. Pt was seen at  and instructed to come to the ER for further evaluation. Pt denies chance of pregnancy and is not on menstrual cycle. Protocol ordered.     Pt is alert and oriented, speaking in full sentences, follows commands and responds appropriately to questions.      Pt placed in lobby. Pt educated on triage process and apologized for wait time. Pt encouraged to alert staff for any changes.     Patient and staff wearing appropriate PPE.      /81   Pulse 78   Temp 36.3 °C (97.3 °F) (Temporal)   Resp 16   Ht 1.524 m (5')   Wt 88.5 kg (195 lb)   SpO2 97%       "

## 2022-07-19 NOTE — DISCHARGE INSTRUCTIONS
As we discussed, we suspect he likely had a kidney stone that appears to have passed at this time.  You do have stones in the kidney still, but these can sometimes progress that the ureters cause pain and cause bleeding.  Please follow-up with urology for recheck of the hematuria especially if it does not improve.    Please also follow-up with your OB/GYN for recheck for your ovarian cyst.  Return to the emergency department for severely worsening symptoms, fevers, or other concerns.

## 2022-07-19 NOTE — ED NOTES
Pt discharged home, pt A&Ox4, on room air, steady gait. Pt educated on worsening s/s, pt verbalized understanding. IV discontinued and gauze placed, pt in possession of belongings. Pt provided discharge education and information pertaining to medications and follow up appointments. Pt received copy of discharge instructions and verbalized understanding. /85   Pulse 78   Temp 36.2 °C (97.1 °F) (Temporal)   Resp 18   Ht 1.524 m (5')   Wt 88.5 kg (195 lb)   LMP 07/04/2022 (Exact Date)   SpO2 97%   BMI 38.08 kg/m²

## 2022-08-06 ENCOUNTER — HOSPITAL ENCOUNTER (EMERGENCY)
Facility: MEDICAL CENTER | Age: 27
End: 2022-08-06
Attending: EMERGENCY MEDICINE
Payer: COMMERCIAL

## 2022-08-06 ENCOUNTER — APPOINTMENT (OUTPATIENT)
Dept: RADIOLOGY | Facility: MEDICAL CENTER | Age: 27
End: 2022-08-06
Attending: EMERGENCY MEDICINE
Payer: COMMERCIAL

## 2022-08-06 VITALS
SYSTOLIC BLOOD PRESSURE: 126 MMHG | DIASTOLIC BLOOD PRESSURE: 80 MMHG | RESPIRATION RATE: 16 BRPM | WEIGHT: 189.82 LBS | OXYGEN SATURATION: 99 % | TEMPERATURE: 98.4 F | HEIGHT: 60 IN | HEART RATE: 78 BPM | BODY MASS INDEX: 37.27 KG/M2

## 2022-08-06 DIAGNOSIS — N20.0 KIDNEY STONE: ICD-10-CM

## 2022-08-06 LAB
ALBUMIN SERPL BCP-MCNC: 4.6 G/DL (ref 3.2–4.9)
ALBUMIN/GLOB SERPL: 1.4 G/DL
ALP SERPL-CCNC: 85 U/L (ref 30–99)
ALT SERPL-CCNC: 24 U/L (ref 2–50)
ANION GAP SERPL CALC-SCNC: 13 MMOL/L (ref 7–16)
APPEARANCE UR: ABNORMAL
AST SERPL-CCNC: 15 U/L (ref 12–45)
BACTERIA #/AREA URNS HPF: NEGATIVE /HPF
BASOPHILS # BLD AUTO: 0.4 % (ref 0–1.8)
BASOPHILS # BLD: 0.05 K/UL (ref 0–0.12)
BILIRUB SERPL-MCNC: 0.4 MG/DL (ref 0.1–1.5)
BILIRUB UR QL STRIP.AUTO: ABNORMAL
BUN SERPL-MCNC: 11 MG/DL (ref 8–22)
CALCIUM SERPL-MCNC: 9.4 MG/DL (ref 8.5–10.5)
CHLORIDE SERPL-SCNC: 105 MMOL/L (ref 96–112)
CO2 SERPL-SCNC: 21 MMOL/L (ref 20–33)
COLOR UR: ABNORMAL
CREAT SERPL-MCNC: 0.78 MG/DL (ref 0.5–1.4)
EOSINOPHIL # BLD AUTO: 0.22 K/UL (ref 0–0.51)
EOSINOPHIL NFR BLD: 1.9 % (ref 0–6.9)
EPI CELLS #/AREA URNS HPF: ABNORMAL /HPF
ERYTHROCYTE [DISTWIDTH] IN BLOOD BY AUTOMATED COUNT: 44.9 FL (ref 35.9–50)
GFR SERPLBLD CREATININE-BSD FMLA CKD-EPI: 106 ML/MIN/1.73 M 2
GLOBULIN SER CALC-MCNC: 3.3 G/DL (ref 1.9–3.5)
GLUCOSE SERPL-MCNC: 97 MG/DL (ref 65–99)
GLUCOSE UR STRIP.AUTO-MCNC: NEGATIVE MG/DL
HCG SERPL QL: NEGATIVE
HCT VFR BLD AUTO: 36.8 % (ref 37–47)
HGB BLD-MCNC: 12 G/DL (ref 12–16)
IMM GRANULOCYTES # BLD AUTO: 0.05 K/UL (ref 0–0.11)
IMM GRANULOCYTES NFR BLD AUTO: 0.4 % (ref 0–0.9)
KETONES UR STRIP.AUTO-MCNC: ABNORMAL MG/DL
LEUKOCYTE ESTERASE UR QL STRIP.AUTO: ABNORMAL
LIPASE SERPL-CCNC: 18 U/L (ref 11–82)
LYMPHOCYTES # BLD AUTO: 2.87 K/UL (ref 1–4.8)
LYMPHOCYTES NFR BLD: 25 % (ref 22–41)
MCH RBC QN AUTO: 29.7 PG (ref 27–33)
MCHC RBC AUTO-ENTMCNC: 32.6 G/DL (ref 33.6–35)
MCV RBC AUTO: 91.1 FL (ref 81.4–97.8)
MICRO URNS: ABNORMAL
MONOCYTES # BLD AUTO: 0.61 K/UL (ref 0–0.85)
MONOCYTES NFR BLD AUTO: 5.3 % (ref 0–13.4)
NEUTROPHILS # BLD AUTO: 7.68 K/UL (ref 2–7.15)
NEUTROPHILS NFR BLD: 67 % (ref 44–72)
NITRITE UR QL STRIP.AUTO: NEGATIVE
NRBC # BLD AUTO: 0 K/UL
NRBC BLD-RTO: 0 /100 WBC
PH UR STRIP.AUTO: 6 [PH] (ref 5–8)
PLATELET # BLD AUTO: 367 K/UL (ref 164–446)
PMV BLD AUTO: 11.1 FL (ref 9–12.9)
POTASSIUM SERPL-SCNC: 4.2 MMOL/L (ref 3.6–5.5)
PROT SERPL-MCNC: 7.9 G/DL (ref 6–8.2)
PROT UR QL STRIP: 30 MG/DL
RBC # BLD AUTO: 4.04 M/UL (ref 4.2–5.4)
RBC # URNS HPF: ABNORMAL /HPF
RBC UR QL AUTO: ABNORMAL
SODIUM SERPL-SCNC: 139 MMOL/L (ref 135–145)
SP GR UR STRIP.AUTO: >=1.03
UROBILINOGEN UR STRIP.AUTO-MCNC: 0.2 MG/DL
WBC # BLD AUTO: 11.5 K/UL (ref 4.8–10.8)
WBC #/AREA URNS HPF: ABNORMAL /HPF

## 2022-08-06 PROCEDURE — 96374 THER/PROPH/DIAG INJ IV PUSH: CPT

## 2022-08-06 PROCEDURE — 700111 HCHG RX REV CODE 636 W/ 250 OVERRIDE (IP): Performed by: EMERGENCY MEDICINE

## 2022-08-06 PROCEDURE — 81001 URINALYSIS AUTO W/SCOPE: CPT

## 2022-08-06 PROCEDURE — 84703 CHORIONIC GONADOTROPIN ASSAY: CPT

## 2022-08-06 PROCEDURE — 80053 COMPREHEN METABOLIC PANEL: CPT

## 2022-08-06 PROCEDURE — 96376 TX/PRO/DX INJ SAME DRUG ADON: CPT

## 2022-08-06 PROCEDURE — 74176 CT ABD & PELVIS W/O CONTRAST: CPT

## 2022-08-06 PROCEDURE — 85025 COMPLETE CBC W/AUTO DIFF WBC: CPT

## 2022-08-06 PROCEDURE — 83690 ASSAY OF LIPASE: CPT

## 2022-08-06 PROCEDURE — 36415 COLL VENOUS BLD VENIPUNCTURE: CPT

## 2022-08-06 PROCEDURE — 99284 EMERGENCY DEPT VISIT MOD MDM: CPT

## 2022-08-06 PROCEDURE — 96375 TX/PRO/DX INJ NEW DRUG ADDON: CPT

## 2022-08-06 RX ORDER — ONDANSETRON 2 MG/ML
4 INJECTION INTRAMUSCULAR; INTRAVENOUS ONCE
Status: COMPLETED | OUTPATIENT
Start: 2022-08-06 | End: 2022-08-06

## 2022-08-06 RX ORDER — TAMSULOSIN HYDROCHLORIDE 0.4 MG/1
0.4 CAPSULE ORAL DAILY
Qty: 30 CAPSULE | Refills: 0 | Status: SHIPPED | OUTPATIENT
Start: 2022-08-06 | End: 2022-08-16

## 2022-08-06 RX ORDER — ONDANSETRON 4 MG/1
4 TABLET, ORALLY DISINTEGRATING ORAL EVERY 6 HOURS PRN
Qty: 10 TABLET | Refills: 0 | Status: SHIPPED | OUTPATIENT
Start: 2022-08-06

## 2022-08-06 RX ORDER — SULFAMETHOXAZOLE AND TRIMETHOPRIM 800; 160 MG/1; MG/1
1 TABLET ORAL 2 TIMES DAILY
Qty: 14 TABLET | Refills: 0 | Status: SHIPPED | OUTPATIENT
Start: 2022-08-06 | End: 2022-08-13

## 2022-08-06 RX ORDER — HYDROCODONE BITARTRATE AND ACETAMINOPHEN 5; 325 MG/1; MG/1
1-2 TABLET ORAL EVERY 6 HOURS PRN
Qty: 15 TABLET | Refills: 0 | Status: SHIPPED | OUTPATIENT
Start: 2022-08-06 | End: 2022-08-09

## 2022-08-06 RX ORDER — TAMSULOSIN HYDROCHLORIDE 0.4 MG/1
0.4 CAPSULE ORAL DAILY
Qty: 10 CAPSULE | Refills: 0 | Status: SHIPPED | OUTPATIENT
Start: 2022-08-06 | End: 2022-08-16

## 2022-08-06 RX ORDER — KETOROLAC TROMETHAMINE 30 MG/ML
15 INJECTION, SOLUTION INTRAMUSCULAR; INTRAVENOUS ONCE
Status: COMPLETED | OUTPATIENT
Start: 2022-08-06 | End: 2022-08-06

## 2022-08-06 RX ORDER — HYDROMORPHONE HYDROCHLORIDE 1 MG/ML
1 INJECTION, SOLUTION INTRAMUSCULAR; INTRAVENOUS; SUBCUTANEOUS ONCE
Status: COMPLETED | OUTPATIENT
Start: 2022-08-06 | End: 2022-08-06

## 2022-08-06 RX ADMIN — KETOROLAC TROMETHAMINE 15 MG: 30 INJECTION, SOLUTION INTRAMUSCULAR at 17:29

## 2022-08-06 RX ADMIN — HYDROMORPHONE HYDROCHLORIDE 1 MG: 1 INJECTION, SOLUTION INTRAMUSCULAR; INTRAVENOUS; SUBCUTANEOUS at 15:00

## 2022-08-06 RX ADMIN — ONDANSETRON 4 MG: 2 INJECTION INTRAMUSCULAR; INTRAVENOUS at 15:00

## 2022-08-06 RX ADMIN — ONDANSETRON 4 MG: 2 INJECTION INTRAMUSCULAR; INTRAVENOUS at 17:40

## 2022-08-06 ASSESSMENT — FIBROSIS 4 INDEX: FIB4 SCORE: 0.26

## 2022-08-06 NOTE — ED TRIAGE NOTES
Chief Complaint   Patient presents with   • Flank Pain     R side 10/10 sharp onset 30 minutes ago. Pt states she has been seen before and told she has ovarian cysts and kidney stones and believes that a stone may be passing or the pain is due to a cyst.       Wheelchair to triage for above complaint.    Educated on triage process, encourage to inform staff of any changes.     /85   Pulse 84   Temp 37.6 °C (99.6 °F) (Temporal)   Resp 16   Ht 1.524 m (5')   Wt 86.1 kg (189 lb 13.1 oz)   LMP 07/30/2022 (Exact Date)   SpO2 99%   BMI 37.07 kg/m²

## 2022-11-16 NOTE — PROGRESS NOTES
Spiritual Care Note    Patient Information     Patient's Name: Bárbara Humphrey   MRN: [id number]    YOB: 1995   Age and Gender: 25 y.o. female   Service Area: ED [facility]   Room (and Bed):  15/15 [facility]   Ethnicity or Nationality:     Primary Language: English   Evangelical/Spiritual preference: Mandaeism   Place of Residence: [place]   Family/Friends/Others Present: Yes   Clinical Team Present: No   Medical Diagnosis(-es)/Procedure(s): Torsion of right ovary   Code Status: No Order    Date of Admission: 4/28/2021   Length of Stay: 0 days        Spiritual Care Provider Information:  Name of Spiritual Care Provider: Yeny Bower  Title of Spiritual Care Provider: Associate   Phone Number: [phone]  E-mail: [email or web address]  Total time : 15 minutes    Encounter/Request Information  Encounter/Request Type   Visited With: Patient and family together  Nature of the Visit: Initial, On shift  Crisis Visit: ED  Referral From/ Origin of Request: [place] rounds, Verbal patient    Religous Needs/Values  Evangelical Needs Visit  Evangelical Needs: Prayer    Spiritual Assessment     Spiritual Care Encounters    Observations/Symptoms: Anxious, Sadness, Tearful    Interaction/Conversation: Pt,with friend at bedside, reported that she and her  have been trying to get pregnant for three years and that she is afraid she may lose an ovary in surgery today. She requested prayer and thanked the . Education given about how to request SC if desired post-op.    Assessment: Need, Distress    Need: Seeking Spiritual Assistance and Support, Family Issues/Concern    Distress: Anxiety about the Future, Coping    Interventions: Compassionate presence, active listening, prayer, emotional support.    Outcomes: Spiritual Comfort, Ability to Communicate with Truth and Honesty    Plan: Visit Upon Request    Notes:             No

## 2023-11-08 ENCOUNTER — HOSPITAL ENCOUNTER (OUTPATIENT)
Facility: MEDICAL CENTER | Age: 28
End: 2023-11-08
Attending: SPECIALIST | Admitting: SPECIALIST
Payer: COMMERCIAL

## (undated) DEVICE — SUCTION INSTRUMENT YANKAUER BULBOUS TIP W/O VENT (50EA/CA)

## (undated) DEVICE — PAD OR TABLE DA VINCI 2IN X 20IN X 72IN - (12EA/CA)

## (undated) DEVICE — HEMOSTAT ARISTA PWD 3 GRAM - (5/CA)

## (undated) DEVICE — SET SUCTION/IRRIGATION WITH DISPOSABLE TIP (6/CA )PART #0250-070-520 IS A SUB

## (undated) DEVICE — TRAY SKIN SCRUB PVP WET (20EA/CA) PART #DYND70356 DISCONTINUED

## (undated) DEVICE — GLOVE SZ 6.5 BIOGEL PI MICRO - PF LF (50PR/BX)

## (undated) DEVICE — CANISTER SUCTION 3000ML MECHANICAL FILTER AUTO SHUTOFF MEDI-VAC NONSTERILE LF DISP  (40EA/CA)

## (undated) DEVICE — SUTURE 4-0 MONOCRYL PLUS PS-2 - 27 INCH (36/BX)

## (undated) DEVICE — DERMABOND ADVANCED - (12EA/BX)

## (undated) DEVICE — GVL 3 STAT DISPOSABLE - (10/BX)

## (undated) DEVICE — PACK LAPAROSCOPY - (1/CA)

## (undated) DEVICE — SET EXTENSION WITH 2 PORTS (48EA/CA) ***PART #2C8610 IS A SUBSTITUTE*****

## (undated) DEVICE — LACTATED RINGERS INJ 1000 ML - (14EA/CA 60CA/PF)

## (undated) DEVICE — SODIUM CHL IRRIGATION 0.9% 1000ML (12EA/CA)

## (undated) DEVICE — HEAD HOLDER JUNIOR/ADULT

## (undated) DEVICE — TUBE E-T HI-LO CUFF 7.0MM (10EA/PK)

## (undated) DEVICE — SUTURE GENERAL

## (undated) DEVICE — SENSOR SPO2 NEO LNCS ADHESIVE (20/BX) SEE USER NOTES

## (undated) DEVICE — PAD SANITARY 11IN MAXI IND WRAPPED  (12EA/PK 24PK/CA)

## (undated) DEVICE — CHLORAPREP 26 ML APPLICATOR - ORANGE TINT(25/CA)

## (undated) DEVICE — ELECTRODE DUAL RETURN W/ CORD - (50/PK)

## (undated) DEVICE — CANNULA W/SEAL 5X100 Z-THRE - ADED KII (12/BX)

## (undated) DEVICE — GLOVE BIOGEL PI INDICATOR SZ 6.5 SURGICAL PF LF - (50/BX 4BX/CA)

## (undated) DEVICE — SUTURE 0 VICRYL PLUS UR-6 - 27 INCH (36/BX)

## (undated) DEVICE — TUBING CLEARLINK DUO-VENT - C-FLO (48EA/CA)

## (undated) DEVICE — GOWN WARMING STANDARD FLEX - (30/CA)

## (undated) DEVICE — GLOVE SZ 8 BIOGEL PI MICRO - PF LF (50PR/BX)

## (undated) DEVICE — TOWEL STOP TIMEOUT SAFETY FLAG (40EA/CA)

## (undated) DEVICE — NEPTUNE 4 PORT MANIFOLD - (20/PK)

## (undated) DEVICE — GOWN SURGICAL XX-LARGE - (28EA/CA) SIRUS NON REINFORCED

## (undated) DEVICE — SET LEADWIRE 5 LEAD BEDSIDE DISPOSABLE ECG (1SET OF 5/EA)

## (undated) DEVICE — LIGASURE LAPAROSCOPIC 5MM - (6EA/CA)

## (undated) DEVICE — MASK ANESTHESIA ADULT  - (100/CA)

## (undated) DEVICE — CANNULA W/SEAL11X100ZTHREAD - (12/BX)

## (undated) DEVICE — ELECTRODE 850 FOAM ADHESIVE - HYDROGEL RADIOTRNSPRNT (50/PK)

## (undated) DEVICE — GLOVE BIOGEL PI INDICATOR SZ 8.0 SURGICAL PF LF -(50/BX 4BX/CA)

## (undated) DEVICE — LIGASURE 5MM BLUNT TIP LONG - 44CM (6EA/PK)

## (undated) DEVICE — TRAY CATHETER FOLEY URINE METER W/STATLOCK 350ML (10EA/CA)

## (undated) DEVICE — GLOVE BIOGEL ECLIPSE  PF LATEX SIZE 6.5 (50PR/BX)

## (undated) DEVICE — PROTECTOR ULNA NERVE - (36PR/CA)

## (undated) DEVICE — APPICATOR HEMOSTAT ARISTA XL - FLEXITIP (10EA/CA)

## (undated) DEVICE — TOWELS CLOTH SURGICAL - (4/PK 20PK/CA)

## (undated) DEVICE — TROCAR Z THREAD11MM OPTICAL - NON BLADED(6/BX)

## (undated) DEVICE — KIT ANESTHESIA W/CIRCUIT & 3/LT BAG W/FILTER (20EA/CA)

## (undated) DEVICE — DETERGENT RENUZYME PLUS 10 OZ PACKET (50/BX)

## (undated) DEVICE — GLOVE BIOGEL PI ORTHO SZ 6 SURGICAL PF LF (40PR/BX)

## (undated) DEVICE — BAG RETRIEVAL 10ML (10EA/BX)

## (undated) DEVICE — GLOVE BIOGEL SZ 6.5 SURGICAL PF LTX (50PR/BX 4BX/CA)

## (undated) DEVICE — TROCAR 5X100 NON BLADED Z-TH - READ KII (6/BX)